# Patient Record
Sex: MALE | Race: WHITE | NOT HISPANIC OR LATINO | URBAN - METROPOLITAN AREA
[De-identification: names, ages, dates, MRNs, and addresses within clinical notes are randomized per-mention and may not be internally consistent; named-entity substitution may affect disease eponyms.]

---

## 2017-11-23 ENCOUNTER — EMERGENCY (EMERGENCY)
Facility: HOSPITAL | Age: 62
LOS: 1 days | Discharge: HOME | End: 2017-11-23
Admitting: EMERGENCY MEDICINE
Payer: MEDICARE

## 2017-11-23 DIAGNOSIS — E11.9 TYPE 2 DIABETES MELLITUS WITHOUT COMPLICATIONS: ICD-10-CM

## 2017-11-23 DIAGNOSIS — S79.911A UNSPECIFIED INJURY OF RIGHT HIP, INITIAL ENCOUNTER: ICD-10-CM
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
## 2017-11-23 DIAGNOSIS — S70.01XA CONTUSION OF RIGHT HIP, INITIAL ENCOUNTER: ICD-10-CM
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
## 2017-11-23 PROCEDURE — 99284 EMERGENCY DEPT VISIT MOD MDM: CPT

## 2017-11-23 PROCEDURE — 73502 X-RAY EXAM HIP UNI 2-3 VIEWS: CPT | Mod: 26,RT

## 2017-11-24 PROCEDURE — 99283 EMERGENCY DEPT VISIT LOW MDM: CPT | Mod: 25

## 2017-11-24 PROCEDURE — 73502 X-RAY EXAM HIP UNI 2-3 VIEWS: CPT

## 2017-11-24 PROCEDURE — 96372 THER/PROPH/DIAG INJ SC/IM: CPT

## 2017-12-02 ENCOUNTER — EMERGENCY (EMERGENCY)
Facility: HOSPITAL | Age: 62
LOS: 1 days | Discharge: HOME | End: 2017-12-02
Admitting: EMERGENCY MEDICINE
Payer: MEDICARE
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
## 2017-12-02 DIAGNOSIS — S89.91XA UNSPECIFIED INJURY OF RIGHT LOWER LEG, INITIAL ENCOUNTER: ICD-10-CM

## 2017-12-02 DIAGNOSIS — M25.551 PAIN IN RIGHT HIP: ICD-10-CM

## 2017-12-02 DIAGNOSIS — E11.9 TYPE 2 DIABETES MELLITUS WITHOUT COMPLICATIONS: ICD-10-CM
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
## 2017-12-02 DIAGNOSIS — S80.211A ABRASION, RIGHT KNEE, INITIAL ENCOUNTER: ICD-10-CM

## 2017-12-02 PROCEDURE — 99284 EMERGENCY DEPT VISIT MOD MDM: CPT

## 2017-12-02 PROCEDURE — 76376 3D RENDER W/INTRP POSTPROCES: CPT

## 2017-12-02 PROCEDURE — 76376 3D RENDER W/INTRP POSTPROCES: CPT | Mod: 26

## 2017-12-02 PROCEDURE — 72192 CT PELVIS W/O DYE: CPT

## 2017-12-02 PROCEDURE — 72192 CT PELVIS W/O DYE: CPT | Mod: 26

## 2017-12-02 PROCEDURE — 99284 EMERGENCY DEPT VISIT MOD MDM: CPT | Mod: 25

## 2018-06-21 ENCOUNTER — EMERGENCY (EMERGENCY)
Facility: HOSPITAL | Age: 63
LOS: 1 days | Discharge: HOME | End: 2018-06-21
Attending: EMERGENCY MEDICINE | Admitting: EMERGENCY MEDICINE
Payer: MEDICARE

## 2018-06-21 VITALS
RESPIRATION RATE: 20 BRPM | OXYGEN SATURATION: 100 % | HEART RATE: 87 BPM | DIASTOLIC BLOOD PRESSURE: 69 MMHG | SYSTOLIC BLOOD PRESSURE: 139 MMHG

## 2018-06-21 VITALS
HEART RATE: 79 BPM | SYSTOLIC BLOOD PRESSURE: 158 MMHG | DIASTOLIC BLOOD PRESSURE: 87 MMHG | TEMPERATURE: 97 F | RESPIRATION RATE: 20 BRPM | OXYGEN SATURATION: 95 % | WEIGHT: 259.93 LBS

## 2018-06-21 LAB
ALBUMIN SERPL ELPH-MCNC: 3.4 G/DL — SIGNIFICANT CHANGE UP (ref 3.3–5)
ALP SERPL-CCNC: 141 U/L — HIGH (ref 40–120)
ALT FLD-CCNC: 35 U/L DA — SIGNIFICANT CHANGE UP (ref 10–45)
ANION GAP SERPL CALC-SCNC: 10 MMOL/L — SIGNIFICANT CHANGE UP (ref 5–17)
APTT BLD: 28.5 SEC — SIGNIFICANT CHANGE UP (ref 27.5–37.4)
AST SERPL-CCNC: 22 U/L — SIGNIFICANT CHANGE UP (ref 10–40)
BASOPHILS # BLD AUTO: 0.1 K/UL — SIGNIFICANT CHANGE UP (ref 0–0.2)
BASOPHILS NFR BLD AUTO: 1 % — SIGNIFICANT CHANGE UP (ref 0–2)
BILIRUB SERPL-MCNC: 0.6 MG/DL — SIGNIFICANT CHANGE UP (ref 0.2–1.2)
BUN SERPL-MCNC: 30 MG/DL — HIGH (ref 7–23)
CALCIUM SERPL-MCNC: 9.1 MG/DL — SIGNIFICANT CHANGE UP (ref 8.4–10.5)
CHLORIDE SERPL-SCNC: 104 MMOL/L — SIGNIFICANT CHANGE UP (ref 96–108)
CK MB BLD-MCNC: 1.5 % — SIGNIFICANT CHANGE UP (ref 0–3.5)
CK MB CFR SERPL CALC: 2.8 NG/ML — SIGNIFICANT CHANGE UP (ref 0.5–10)
CK SERPL-CCNC: 187 U/L — SIGNIFICANT CHANGE UP (ref 30–200)
CO2 SERPL-SCNC: 22 MMOL/L — SIGNIFICANT CHANGE UP (ref 22–31)
CREAT SERPL-MCNC: 0.71 MG/DL — SIGNIFICANT CHANGE UP (ref 0.5–1.3)
EOSINOPHIL # BLD AUTO: 0.4 K/UL — SIGNIFICANT CHANGE UP (ref 0–0.5)
EOSINOPHIL NFR BLD AUTO: 4.4 % — SIGNIFICANT CHANGE UP (ref 0–6)
GLUCOSE SERPL-MCNC: 299 MG/DL — HIGH (ref 70–99)
HCT VFR BLD CALC: 44.5 % — SIGNIFICANT CHANGE UP (ref 39–50)
HGB BLD-MCNC: 16.1 G/DL — SIGNIFICANT CHANGE UP (ref 13–17)
INR BLD: 0.98 RATIO — SIGNIFICANT CHANGE UP (ref 0.88–1.16)
LYMPHOCYTES # BLD AUTO: 1.2 K/UL — SIGNIFICANT CHANGE UP (ref 1–3.3)
LYMPHOCYTES # BLD AUTO: 13.9 % — SIGNIFICANT CHANGE UP (ref 13–44)
MCHC RBC-ENTMCNC: 31.6 PG — SIGNIFICANT CHANGE UP (ref 27–34)
MCHC RBC-ENTMCNC: 36.3 GM/DL — HIGH (ref 32–36)
MCV RBC AUTO: 87 FL — SIGNIFICANT CHANGE UP (ref 80–100)
MONOCYTES # BLD AUTO: 0.6 K/UL — SIGNIFICANT CHANGE UP (ref 0–0.9)
MONOCYTES NFR BLD AUTO: 6.7 % — SIGNIFICANT CHANGE UP (ref 2–14)
NEUTROPHILS # BLD AUTO: 6.5 K/UL — SIGNIFICANT CHANGE UP (ref 1.8–7.4)
NEUTROPHILS NFR BLD AUTO: 74 % — SIGNIFICANT CHANGE UP (ref 43–77)
PLATELET # BLD AUTO: 184 K/UL — SIGNIFICANT CHANGE UP (ref 150–400)
POTASSIUM SERPL-MCNC: 4.4 MMOL/L — SIGNIFICANT CHANGE UP (ref 3.5–5.3)
POTASSIUM SERPL-SCNC: 4.4 MMOL/L — SIGNIFICANT CHANGE UP (ref 3.5–5.3)
PROT SERPL-MCNC: 7.2 G/DL — SIGNIFICANT CHANGE UP (ref 6–8.3)
PROTHROM AB SERPL-ACNC: 10.9 SEC — SIGNIFICANT CHANGE UP (ref 9.8–12.7)
RBC # BLD: 5.11 M/UL — SIGNIFICANT CHANGE UP (ref 4.2–5.8)
RBC # FLD: 11.2 % — SIGNIFICANT CHANGE UP (ref 10.3–14.5)
SODIUM SERPL-SCNC: 136 MMOL/L — SIGNIFICANT CHANGE UP (ref 135–145)
TROPONIN I SERPL-MCNC: <.017 NG/ML — LOW (ref 0.02–0.06)
WBC # BLD: 8.8 K/UL — SIGNIFICANT CHANGE UP (ref 3.8–10.5)
WBC # FLD AUTO: 8.8 K/UL — SIGNIFICANT CHANGE UP (ref 3.8–10.5)

## 2018-06-21 PROCEDURE — 70450 CT HEAD/BRAIN W/O DYE: CPT | Mod: 26

## 2018-06-21 PROCEDURE — 70486 CT MAXILLOFACIAL W/O DYE: CPT

## 2018-06-21 PROCEDURE — 85730 THROMBOPLASTIN TIME PARTIAL: CPT

## 2018-06-21 PROCEDURE — 71045 X-RAY EXAM CHEST 1 VIEW: CPT

## 2018-06-21 PROCEDURE — 82550 ASSAY OF CK (CPK): CPT

## 2018-06-21 PROCEDURE — 71045 X-RAY EXAM CHEST 1 VIEW: CPT | Mod: 26

## 2018-06-21 PROCEDURE — 70486 CT MAXILLOFACIAL W/O DYE: CPT | Mod: 26

## 2018-06-21 PROCEDURE — 80053 COMPREHEN METABOLIC PANEL: CPT

## 2018-06-21 PROCEDURE — 84484 ASSAY OF TROPONIN QUANT: CPT

## 2018-06-21 PROCEDURE — 93010 ELECTROCARDIOGRAM REPORT: CPT

## 2018-06-21 PROCEDURE — 99284 EMERGENCY DEPT VISIT MOD MDM: CPT | Mod: 25

## 2018-06-21 PROCEDURE — 93005 ELECTROCARDIOGRAM TRACING: CPT

## 2018-06-21 PROCEDURE — 82553 CREATINE MB FRACTION: CPT

## 2018-06-21 PROCEDURE — 82962 GLUCOSE BLOOD TEST: CPT

## 2018-06-21 PROCEDURE — 99285 EMERGENCY DEPT VISIT HI MDM: CPT

## 2018-06-21 PROCEDURE — 70450 CT HEAD/BRAIN W/O DYE: CPT

## 2018-06-21 PROCEDURE — 72125 CT NECK SPINE W/O DYE: CPT | Mod: 26

## 2018-06-21 PROCEDURE — G0426: CPT

## 2018-06-21 PROCEDURE — 85610 PROTHROMBIN TIME: CPT

## 2018-06-21 PROCEDURE — 85027 COMPLETE CBC AUTOMATED: CPT

## 2018-06-21 PROCEDURE — 72125 CT NECK SPINE W/O DYE: CPT

## 2018-06-21 NOTE — ED PROVIDER NOTE - PROGRESS NOTE DETAILS
Minor delay to CT due to initial history did not indicate last known normal, initially presented as unknown last seen normal. Upon further detailed questioning, pt was able to recall events that occurred at 5am. Pt currently A&Ox3, declining obs vs admit for tele. Pt has decisional capacity and understands the risk of signing out AMA, including death. Asked to call his sister Jimbo Damon [phone]. I spoke with his sister, states he follows up with Dr. Prince and she will ensure that he will. Dr. Prince paged. Spoke with Dr. Prince, will see pt in the office tomorrow.

## 2018-06-21 NOTE — ED PROVIDER NOTE - CARE PLAN
Principal Discharge DX:	Syncope, unspecified syncope type  Secondary Diagnosis:	Altered mental status, unspecified altered mental status type

## 2018-06-21 NOTE — STROKE CODE NOTE - SUBJECTIVE
62-year-old ambidextrous white gentleman first evaluated by telestroke at [facility] on 6/21/18 with change in mental status. On 6/21/18, he was last known normal at around 5:20 AM. At about 7:20 AM, he was found lying on his driveway by his neighbor, apparently "confused", and possibly with some abnormal movements of his extremities, possibly shaking (uncertain). Medications at home included aspirin. ROS otherwise negative. Exam. Right forehead hematoma. NIHSS = 0 versus 1 (depending on whether or not a slight facial asymmetry is physiological or pathological) Alert and attentive; oriented x3; follows commands; ? subtle left facial palsy (most likely constitutional); remainder of neurologic exam was nonfocal. CT head (6/21/18) to my eye was unremarkable. Fingerstick = 296.

## 2018-06-21 NOTE — ED PROVIDER NOTE - FAMILY HISTORY
Sibling  Still living? No  Family history of colon cancer, Age at diagnosis: Age Unknown     Mother  Still living? No  Family history of bone cancer, Age at diagnosis: Age Unknown

## 2018-06-21 NOTE — ED PROVIDER NOTE - OBJECTIVE STATEMENT
62M h/o DM, unemployed brought in by EMS for found on floor, initially altered, now at baseline. Per EMS pt was found on his driveway this AM by his neighbor, soaked due to rain, shaking his body, unknown urinary incontinence. Initially A&Ox0 and now A&Ox3. Unk sz or CVA d/o.     Per pt he was walking around collecting bottles at 5am and the next thing he remembers 62M h/o DM, unemployed brought in by EMS for found on floor, initially altered, now at baseline. Per EMS pt was found on his driveway this AM by his neighbor, soaked due to rain, shaking his body, unknown urinary incontinence. Initially A&Ox0 and now A&Ox3. Unk sz or CVA d/o.     Per pt he was walking around collecting bottles at 5am and the next thing he remembers was waking up on the floor with EMS. No weakness or numbness in arms or legs, no diff speaking. No chest pain or sob. No fevers or chills. No nausea or vomiting.

## 2018-06-21 NOTE — ED ADULT NURSE NOTE - OBJECTIVE STATEMENT
As per EMS, pt was found in driveway by neighbor. Pt reports taking daily walk around neighborhood and does not remember falling to ground. Presents with contusion to right side of forehead. As per EMS, pt was found in driveway by neighbor at 0730. Pt reports taking daily walk around neighborhood at 5am and does not remember falling to ground. Presents with contusion to right side of forehead.

## 2018-06-25 DIAGNOSIS — R56.9 UNSPECIFIED CONVULSIONS: ICD-10-CM

## 2019-06-28 ENCOUNTER — OUTPATIENT (OUTPATIENT)
Dept: OUTPATIENT SERVICES | Facility: HOSPITAL | Age: 64
LOS: 1 days | End: 2019-06-28
Payer: MEDICARE

## 2019-06-28 ENCOUNTER — APPOINTMENT (OUTPATIENT)
Dept: ULTRASOUND IMAGING | Facility: CLINIC | Age: 64
End: 2019-06-28
Payer: MEDICARE

## 2019-06-28 DIAGNOSIS — Z00.8 ENCOUNTER FOR OTHER GENERAL EXAMINATION: ICD-10-CM

## 2019-06-28 PROCEDURE — 76882 US LMTD JT/FCL EVL NVASC XTR: CPT | Mod: 26,RT

## 2019-06-28 PROCEDURE — 76882 US LMTD JT/FCL EVL NVASC XTR: CPT

## 2019-11-19 ENCOUNTER — EMERGENCY (EMERGENCY)
Facility: HOSPITAL | Age: 64
LOS: 1 days | Discharge: HOME | End: 2019-11-19
Attending: EMERGENCY MEDICINE | Admitting: EMERGENCY MEDICINE
Payer: MEDICARE

## 2019-11-19 VITALS
TEMPERATURE: 99 F | SYSTOLIC BLOOD PRESSURE: 183 MMHG | DIASTOLIC BLOOD PRESSURE: 107 MMHG | OXYGEN SATURATION: 96 % | HEIGHT: 76 IN | RESPIRATION RATE: 18 BRPM | HEART RATE: 99 BPM | WEIGHT: 250 LBS

## 2019-11-19 LAB
ALBUMIN SERPL ELPH-MCNC: 3.5 G/DL — SIGNIFICANT CHANGE UP (ref 3.3–5)
ALP SERPL-CCNC: 129 U/L — HIGH (ref 40–120)
ALT FLD-CCNC: 29 U/L — SIGNIFICANT CHANGE UP (ref 10–45)
AST SERPL-CCNC: 18 U/L — SIGNIFICANT CHANGE UP (ref 10–40)
BILIRUB DIRECT SERPL-MCNC: 0.1 MG/DL — SIGNIFICANT CHANGE UP (ref 0–0.2)
BILIRUB INDIRECT FLD-MCNC: 0.6 MG/DL — SIGNIFICANT CHANGE UP (ref 0.2–1)
BILIRUB SERPL-MCNC: 0.7 MG/DL — SIGNIFICANT CHANGE UP (ref 0.2–1.2)
PROT SERPL-MCNC: 7.5 G/DL — SIGNIFICANT CHANGE UP (ref 6–8.3)

## 2019-11-19 PROCEDURE — 71101 X-RAY EXAM UNILAT RIBS/CHEST: CPT

## 2019-11-19 PROCEDURE — 71101 X-RAY EXAM UNILAT RIBS/CHEST: CPT | Mod: 26

## 2019-11-19 PROCEDURE — 99284 EMERGENCY DEPT VISIT MOD MDM: CPT

## 2019-11-19 PROCEDURE — 99283 EMERGENCY DEPT VISIT LOW MDM: CPT

## 2019-11-19 PROCEDURE — 36415 COLL VENOUS BLD VENIPUNCTURE: CPT

## 2019-11-19 PROCEDURE — 80076 HEPATIC FUNCTION PANEL: CPT

## 2019-11-19 RX ORDER — ACETAMINOPHEN 500 MG
650 TABLET ORAL ONCE
Refills: 0 | Status: COMPLETED | OUTPATIENT
Start: 2019-11-19 | End: 2019-11-19

## 2019-11-19 RX ADMIN — Medication 650 MILLIGRAM(S): at 12:18

## 2019-11-19 NOTE — ED PROVIDER NOTE - OBJECTIVE STATEMENT
Patient notes that on Sunday, 3 days ago, he was reaching for something high up on a shelf and he slipped, striking the right side of his chest. He has been taking advil prn for pain but due to the fact that the pain persists, the patient came to the ED. Pain exac with movement, breathing and local palpation. No bruising noted to chest or back. No hematuria. No vomiting. No head injury or LOC.

## 2019-11-19 NOTE — ED ADULT NURSE NOTE - CHPI ED NUR SYMPTOMS NEG
no vomiting/no loss of consciousness/no numbness/no abrasion/no deformity/no weakness/no confusion/no tingling/no bleeding/no fever

## 2019-11-19 NOTE — ED PROVIDER NOTE - NSFOLLOWUPINSTRUCTIONS_ED_ALL_ED_FT
Rib Fracture(s)    A rib fracture is a break or crack in one of the bones of the ribs. The ribs are a group of long, curved bones that wrap around your chest and attach to your spine. A broken or cracked rib is often painful, but most do not cause other problems and heal on their own over time. Symptoms include pain when you breath or cough or pain when pressing over the area. Breathing exercises will help keep your lungs inflated and prevent lung collapse or infection.    Take Tylenol 650 mg every 6 hours as needed for pain or Ibuprofen 600mg every 6 hours as needed for pain.   If any new bruising occurs, return to the ED immediately. Also read below for other reasons to return to the ED. Follow up with your primary medical doctor. Thank you.     SEEK IMMEDIATE MEDICAL CARE IF YOU HAVE ANY OF THE FOLLOWING SYMPTOMS: fever, shortness of breath, coughing up blood, abdominal pain, nausea or vomiting, pain not controlled with medications.

## 2019-11-19 NOTE — ED PROVIDER NOTE - PATIENT PORTAL LINK FT
You can access the FollowMyHealth Patient Portal offered by [facility] by registering at the following website: [email or web address]. By joining [facility] [employer]’s FollowMyHealth portal, you will also be able to view your health information using other applications (apps) compatible with our system.

## 2019-11-19 NOTE — ED PROVIDER NOTE - CLINICAL SUMMARY MEDICAL DECISION MAKING FREE TEXT BOX
Patient notes that on Sunday, 3 days ago, he was reaching for something high up on a shelf and he slipped, striking the right side of his chest. He has been taking advil prn for pain but due to the fact that the pain persists, the patient came to the ED. Pain exac with movement, breathing and local palpation. No bruising noted to chest or back. No hematuria. No vomiting. No head injury or LOC.  Suspect right lower rib fracture. Since overlying the liver, LFT ordered. No bruising noted. LFTs normal. Xray report with 8th rib fracture. Pt made aware. F/U PCP. Advil or Tylenol prn pain.

## 2019-11-25 DIAGNOSIS — S22.31XA FRACTURE OF ONE RIB, RIGHT SIDE, INITIAL ENCOUNTER FOR CLOSED FRACTURE: ICD-10-CM

## 2020-03-08 NOTE — ED PROVIDER NOTE - CARDIAC, MLM
Normal rate, regular rhythm.  Heart sounds S1, S2.  No murmurs, rubs or gallops.
1) PCP Contacted on Admission: (Y/N) --> Name & Phone #:  2) Date of Contact with PCP:  3) PCP Contacted at Discharge: (Y/N, N/A)  4) Summary of Handoff Given to PCP:   5) Post-Discharge Appointment Date and Location:

## 2020-07-14 ENCOUNTER — INPATIENT (INPATIENT)
Facility: HOSPITAL | Age: 65
LOS: 3 days | Discharge: HOME | DRG: 308 | End: 2020-07-18
Attending: INTERNAL MEDICINE | Admitting: INTERNAL MEDICINE
Payer: MEDICARE

## 2020-07-14 VITALS
OXYGEN SATURATION: 92 % | HEIGHT: 76 IN | RESPIRATION RATE: 22 BRPM | TEMPERATURE: 98 F | WEIGHT: 250 LBS | DIASTOLIC BLOOD PRESSURE: 89 MMHG | HEART RATE: 134 BPM | SYSTOLIC BLOOD PRESSURE: 163 MMHG

## 2020-07-14 DIAGNOSIS — R73.9 HYPERGLYCEMIA, UNSPECIFIED: ICD-10-CM

## 2020-07-14 LAB
ACETONE SERPL-MCNC: ABNORMAL
ALBUMIN SERPL ELPH-MCNC: 3.7 G/DL — SIGNIFICANT CHANGE UP (ref 3.3–5)
ALP SERPL-CCNC: 274 U/L — HIGH (ref 40–120)
ALT FLD-CCNC: 113 U/L — HIGH (ref 10–45)
ANION GAP SERPL CALC-SCNC: 20 MMOL/L — HIGH (ref 5–17)
APPEARANCE UR: CLEAR — SIGNIFICANT CHANGE UP
AST SERPL-CCNC: 38 U/L — SIGNIFICANT CHANGE UP (ref 10–40)
BACTERIA # UR AUTO: ABNORMAL /HPF
BASOPHILS # BLD AUTO: 0.04 K/UL — SIGNIFICANT CHANGE UP (ref 0–0.2)
BASOPHILS NFR BLD AUTO: 0.4 % — SIGNIFICANT CHANGE UP (ref 0–2)
BILIRUB SERPL-MCNC: 0.5 MG/DL — SIGNIFICANT CHANGE UP (ref 0.2–1.2)
BILIRUB UR-MCNC: NEGATIVE — SIGNIFICANT CHANGE UP
BUN SERPL-MCNC: 41 MG/DL — HIGH (ref 7–23)
CALCIUM SERPL-MCNC: 9.9 MG/DL — SIGNIFICANT CHANGE UP (ref 8.4–10.5)
CHLORIDE SERPL-SCNC: 103 MMOL/L — SIGNIFICANT CHANGE UP (ref 96–108)
CO2 BLDV-SCNC: 20 MMOL/L — LOW (ref 21–29)
CO2 SERPL-SCNC: 18 MMOL/L — LOW (ref 22–31)
COLOR SPEC: YELLOW — SIGNIFICANT CHANGE UP
CREAT SERPL-MCNC: 1.46 MG/DL — HIGH (ref 0.5–1.3)
DIFF PNL FLD: ABNORMAL
EOSINOPHIL # BLD AUTO: 0.01 K/UL — SIGNIFICANT CHANGE UP (ref 0–0.5)
EOSINOPHIL NFR BLD AUTO: 0.1 % — SIGNIFICANT CHANGE UP (ref 0–6)
EPI CELLS # UR: SIGNIFICANT CHANGE UP
ETHANOL SERPL-MCNC: <3 MG/DL — SIGNIFICANT CHANGE UP (ref 0–3)
GLUCOSE SERPL-MCNC: 816 MG/DL — CRITICAL HIGH (ref 70–99)
GLUCOSE UR QL: 1000 MG/DL
HCT VFR BLD CALC: 49.5 % — SIGNIFICANT CHANGE UP (ref 39–50)
HGB BLD-MCNC: 17.4 G/DL — HIGH (ref 13–17)
IMM GRANULOCYTES NFR BLD AUTO: 0.7 % — SIGNIFICANT CHANGE UP (ref 0–1.5)
KETONES UR-MCNC: ABNORMAL
LACTATE SERPL-SCNC: 2.5 MMOL/L — HIGH (ref 0.7–2)
LEUKOCYTE ESTERASE UR-ACNC: NEGATIVE — SIGNIFICANT CHANGE UP
LYMPHOCYTES # BLD AUTO: 0.81 K/UL — LOW (ref 1–3.3)
LYMPHOCYTES # BLD AUTO: 8.2 % — LOW (ref 13–44)
MAGNESIUM SERPL-MCNC: 2.4 MG/DL — SIGNIFICANT CHANGE UP (ref 1.6–2.6)
MCHC RBC-ENTMCNC: 30.1 PG — SIGNIFICANT CHANGE UP (ref 27–34)
MCHC RBC-ENTMCNC: 35.2 GM/DL — SIGNIFICANT CHANGE UP (ref 32–36)
MCV RBC AUTO: 85.6 FL — SIGNIFICANT CHANGE UP (ref 80–100)
MONOCYTES # BLD AUTO: 0.47 K/UL — SIGNIFICANT CHANGE UP (ref 0–0.9)
MONOCYTES NFR BLD AUTO: 4.7 % — SIGNIFICANT CHANGE UP (ref 2–14)
NEUTROPHILS # BLD AUTO: 8.53 K/UL — HIGH (ref 1.8–7.4)
NEUTROPHILS NFR BLD AUTO: 85.9 % — HIGH (ref 43–77)
NITRITE UR-MCNC: NEGATIVE — SIGNIFICANT CHANGE UP
NRBC # BLD: 0 /100 WBCS — SIGNIFICANT CHANGE UP (ref 0–0)
PCO2 BLDV: 35 MMHG — SIGNIFICANT CHANGE UP (ref 35–50)
PCP SPEC-MCNC: SIGNIFICANT CHANGE UP
PH BLDV: 7.35 — SIGNIFICANT CHANGE UP (ref 7.35–7.45)
PH UR: 5 — SIGNIFICANT CHANGE UP (ref 5–8)
PHOSPHATE SERPL-MCNC: 5.2 MG/DL — HIGH (ref 2.5–4.5)
PLATELET # BLD AUTO: 244 K/UL — SIGNIFICANT CHANGE UP (ref 150–400)
PO2 BLDV: 49 MMHG — HIGH (ref 25–45)
POTASSIUM SERPL-MCNC: 4.2 MMOL/L — SIGNIFICANT CHANGE UP (ref 3.5–5.3)
POTASSIUM SERPL-SCNC: 4.2 MMOL/L — SIGNIFICANT CHANGE UP (ref 3.5–5.3)
PROT SERPL-MCNC: 7.5 G/DL — SIGNIFICANT CHANGE UP (ref 6–8.3)
PROT UR-MCNC: 30 MG/DL
RBC # BLD: 5.78 M/UL — SIGNIFICANT CHANGE UP (ref 4.2–5.8)
RBC # FLD: 11.9 % — SIGNIFICANT CHANGE UP (ref 10.3–14.5)
RBC CASTS # UR COMP ASSIST: SIGNIFICANT CHANGE UP /HPF (ref 0–4)
SAO2 % BLDV: 81 % — SIGNIFICANT CHANGE UP (ref 67–88)
SODIUM SERPL-SCNC: 141 MMOL/L — SIGNIFICANT CHANGE UP (ref 135–145)
SP GR SPEC: 1.01 — SIGNIFICANT CHANGE UP (ref 1.01–1.02)
TROPONIN I SERPL-MCNC: <.017 NG/ML — LOW (ref 0.02–0.06)
UROBILINOGEN FLD QL: NEGATIVE — SIGNIFICANT CHANGE UP
WBC # BLD: 9.93 K/UL — SIGNIFICANT CHANGE UP (ref 3.8–10.5)
WBC # FLD AUTO: 9.93 K/UL — SIGNIFICANT CHANGE UP (ref 3.8–10.5)
WBC UR QL: NEGATIVE /HPF — SIGNIFICANT CHANGE UP (ref 0–5)

## 2020-07-14 PROCEDURE — 93010 ELECTROCARDIOGRAM REPORT: CPT

## 2020-07-14 PROCEDURE — 99291 CRITICAL CARE FIRST HOUR: CPT | Mod: CS

## 2020-07-14 PROCEDURE — 70450 CT HEAD/BRAIN W/O DYE: CPT | Mod: 26

## 2020-07-14 PROCEDURE — 71045 X-RAY EXAM CHEST 1 VIEW: CPT | Mod: 26

## 2020-07-14 RX ORDER — CEFEPIME 1 G/1
2000 INJECTION, POWDER, FOR SOLUTION INTRAMUSCULAR; INTRAVENOUS ONCE
Refills: 0 | Status: COMPLETED | OUTPATIENT
Start: 2020-07-14 | End: 2020-07-14

## 2020-07-14 RX ORDER — DILTIAZEM HCL 120 MG
5 CAPSULE, EXT RELEASE 24 HR ORAL
Qty: 125 | Refills: 0 | Status: DISCONTINUED | OUTPATIENT
Start: 2020-07-14 | End: 2020-07-15

## 2020-07-14 RX ORDER — SODIUM CHLORIDE 9 MG/ML
2000 INJECTION INTRAMUSCULAR; INTRAVENOUS; SUBCUTANEOUS ONCE
Refills: 0 | Status: COMPLETED | OUTPATIENT
Start: 2020-07-14 | End: 2020-07-14

## 2020-07-14 RX ORDER — INSULIN HUMAN 100 [IU]/ML
10 INJECTION, SOLUTION SUBCUTANEOUS
Qty: 100 | Refills: 0 | Status: DISCONTINUED | OUTPATIENT
Start: 2020-07-14 | End: 2020-07-15

## 2020-07-14 RX ORDER — SODIUM CHLORIDE 9 MG/ML
1000 INJECTION, SOLUTION INTRAVENOUS
Refills: 0 | Status: DISCONTINUED | OUTPATIENT
Start: 2020-07-14 | End: 2020-07-15

## 2020-07-14 RX ORDER — DILTIAZEM HCL 120 MG
15 CAPSULE, EXT RELEASE 24 HR ORAL ONCE
Refills: 0 | Status: COMPLETED | OUTPATIENT
Start: 2020-07-14 | End: 2020-07-14

## 2020-07-14 RX ORDER — ASPIRIN/CALCIUM CARB/MAGNESIUM 324 MG
81 TABLET ORAL DAILY
Refills: 0 | Status: DISCONTINUED | OUTPATIENT
Start: 2020-07-14 | End: 2020-07-14

## 2020-07-14 RX ORDER — DILTIAZEM HCL 120 MG
10 CAPSULE, EXT RELEASE 24 HR ORAL ONCE
Refills: 0 | Status: COMPLETED | OUTPATIENT
Start: 2020-07-14 | End: 2020-07-14

## 2020-07-14 RX ORDER — TETANUS TOXOID, REDUCED DIPHTHERIA TOXOID AND ACELLULAR PERTUSSIS VACCINE, ADSORBED 5; 2.5; 8; 8; 2.5 [IU]/.5ML; [IU]/.5ML; UG/.5ML; UG/.5ML; UG/.5ML
0.5 SUSPENSION INTRAMUSCULAR ONCE
Refills: 0 | Status: COMPLETED | OUTPATIENT
Start: 2020-07-14 | End: 2020-07-14

## 2020-07-14 RX ORDER — ATORVASTATIN CALCIUM 80 MG/1
10 TABLET, FILM COATED ORAL AT BEDTIME
Refills: 0 | Status: DISCONTINUED | OUTPATIENT
Start: 2020-07-14 | End: 2020-07-18

## 2020-07-14 RX ORDER — SODIUM CHLORIDE 9 MG/ML
1000 INJECTION, SOLUTION INTRAVENOUS
Refills: 0 | Status: DISCONTINUED | OUTPATIENT
Start: 2020-07-14 | End: 2020-07-14

## 2020-07-14 RX ORDER — METOPROLOL TARTRATE 50 MG
25 TABLET ORAL
Refills: 0 | Status: DISCONTINUED | OUTPATIENT
Start: 2020-07-14 | End: 2020-07-18

## 2020-07-14 RX ORDER — INSULIN HUMAN 100 [IU]/ML
10 INJECTION, SOLUTION SUBCUTANEOUS ONCE
Refills: 0 | Status: COMPLETED | OUTPATIENT
Start: 2020-07-14 | End: 2020-07-14

## 2020-07-14 RX ORDER — CHLORHEXIDINE GLUCONATE 213 G/1000ML
1 SOLUTION TOPICAL
Refills: 0 | Status: DISCONTINUED | OUTPATIENT
Start: 2020-07-14 | End: 2020-07-18

## 2020-07-14 RX ORDER — SODIUM CHLORIDE 9 MG/ML
700 INJECTION INTRAMUSCULAR; INTRAVENOUS; SUBCUTANEOUS ONCE
Refills: 0 | Status: COMPLETED | OUTPATIENT
Start: 2020-07-14 | End: 2020-07-14

## 2020-07-14 RX ADMIN — Medication 10 MILLIGRAM(S): at 21:05

## 2020-07-14 RX ADMIN — INSULIN HUMAN 10 UNIT(S)/HR: 100 INJECTION, SOLUTION SUBCUTANEOUS at 23:15

## 2020-07-14 RX ADMIN — Medication 10 MILLIGRAM(S): at 21:14

## 2020-07-14 RX ADMIN — Medication 5 MG/HR: at 22:06

## 2020-07-14 RX ADMIN — SODIUM CHLORIDE 2000 MILLILITER(S): 9 INJECTION INTRAMUSCULAR; INTRAVENOUS; SUBCUTANEOUS at 20:42

## 2020-07-14 RX ADMIN — SODIUM CHLORIDE 2000 MILLILITER(S): 9 INJECTION INTRAMUSCULAR; INTRAVENOUS; SUBCUTANEOUS at 22:06

## 2020-07-14 RX ADMIN — Medication 15 MILLIGRAM(S): at 22:20

## 2020-07-14 RX ADMIN — Medication 10 MILLIGRAM(S): at 21:01

## 2020-07-14 RX ADMIN — SODIUM CHLORIDE 700 MILLILITER(S): 9 INJECTION INTRAMUSCULAR; INTRAVENOUS; SUBCUTANEOUS at 22:06

## 2020-07-14 RX ADMIN — Medication 15 MILLIGRAM(S): at 21:30

## 2020-07-14 RX ADMIN — CEFEPIME 100 MILLIGRAM(S): 1 INJECTION, POWDER, FOR SOLUTION INTRAMUSCULAR; INTRAVENOUS at 22:30

## 2020-07-14 RX ADMIN — SODIUM CHLORIDE 200 MILLILITER(S): 9 INJECTION, SOLUTION INTRAVENOUS at 23:19

## 2020-07-14 RX ADMIN — Medication 25 MILLIGRAM(S): at 23:13

## 2020-07-14 RX ADMIN — INSULIN HUMAN 10 UNIT(S): 100 INJECTION, SOLUTION SUBCUTANEOUS at 20:31

## 2020-07-14 RX ADMIN — TETANUS TOXOID, REDUCED DIPHTHERIA TOXOID AND ACELLULAR PERTUSSIS VACCINE, ADSORBED 0.5 MILLILITER(S): 5; 2.5; 8; 8; 2.5 SUSPENSION INTRAMUSCULAR at 20:46

## 2020-07-14 NOTE — ED ADULT NURSE NOTE - NSFALLRSKASSESSTYPE_ED_ALL_ED
Initial (On Arrival) Propranolol Counseling:  I discussed with the patient the risks of propranolol including but not limited to low heart rate, low blood pressure, low blood sugar, restlessness and increased cold sensitivity. They should call the office if they experience any of these side effects.

## 2020-07-14 NOTE — H&P ADULT - ASSESSMENT
63 y/o male with pmhx of HTN, DM II, HLD admitted s/p syncopal episode with DKA and afib with RVR along with ? very small SDH.    NEURO: q1 hour neurochecks with repeat CT head in 6 hours.     CVS: on cardizem drip at 15 mg/hr. also started PO metoprolol. wean off drip as tolerated.  goal HR < 100. check TTE. cardio consult in am.     PULM: weaned to nasal cannula, keep sats above 92%.  GI: npo except water and meds  RENAL: mild SOUMYA. hold home ramipril. q 4-6 hour bmp replete lytes as necessary. trend bun/cr and urine output.    ID: COVID-19 negative. afebrile. low suspicion for covid.    ENDO: received 10 units regular insulin IVP in ED. start infusion at 0.1 units/kg. accuchecks q 1 hours while on insulin drip. titrate per protocol. LR with KCL replacement at 200/hr and switch to dextrose containing fluids when BS < 250. endocrine consult in am.    HEME: hold anticoagulation until repeat CT head. if no SDH start a/c.   DISPO: full code    discussed with eICU attending dr milligan.

## 2020-07-14 NOTE — ED PROVIDER NOTE - CLINICAL SUMMARY MEDICAL DECISION MAKING FREE TEXT BOX
65 y/o M with PMH of DM on metformin and humalog, HTN, HLD, takes ASA was BIB EMS for fall with head injury x today and hyperglycemia. Patient was at shop and stop, states he felt lightheaded and fell hitting his head. Per EMS glucometer read as "high". Patient denies LOC.  He denies CP, SOB, f/c, cough, n/v/d, abd pain, urinary symptoms, extremity weakness or all other complaints. PE as noted above. GLU read as HIGH in the ED. labs/imaging pending. IVF bolus and insulin given. TDAP given 63 y/o M with PMH of DM on metformin and humalog, HTN, HLD, takes ASA was BIB EMS for fall with head injury x today and hyperglycemia. Patient was at shop and stop, states he felt lightheaded and fell hitting his head. Per EMS glucometer read as "high". Patient denies LOC.  He denies CP, SOB, f/c, cough, n/v/d, abd pain, urinary symptoms, extremity weakness or all other complaints. PE as noted above. GLU read as HIGH in the ED. labs/imaging pending. IVF bolus and insulin given. TDAP given    dr feldman [id number]: pt HR improving with cardizem boluses and gtt, -130s. bp stable. pt more alert, improved.  no complaints . no headache. no chest pain, no sob.  neuro intact. CTh noted, ?sliver of SDH vs artifact.  ICU aware M eJrry, they will repeat CT in AM.

## 2020-07-14 NOTE — H&P ADULT - NSHPPHYSICALEXAM_GEN_ALL_CORE
General: Normal appearing in no acute distress resting comfortably in bed. Well nourished and well groomed.  Head: Normocephalic, atraumatic.   EENT: Sclera white. PERRL, EOM intact. Secretions normal. no cervical lymphadenopathy  PULM: Breath sounds clear to auscultation symmetrically throughout all lung fields. No wheezing, rhonchi, or rales. No use of accessory muscles.  CVS: afib with RVR. No murmurs or rubs. Pulses 2+ on upper and lower extremities bilaterally.   GI: nondistended with bowel sounds normoactive in all four quadrants. No tenderness to light or deep palpation.   Neuro: A&O x 3. Strength 5/5 in upper and lower extremities. sensation equal bilaterally. accurate finger to nose. gait not assessed due to afib with RVR.   Skin: No lesions, rashes, ulcers. Extremities equally warm bilaterally.

## 2020-07-14 NOTE — ED ADULT TRIAGE NOTE - CHIEF COMPLAINT QUOTE
"high" fingerstick/ fall with head injury, no LOC   patient is awake and alert upon arrival to unit  patient unable to provide daily medications

## 2020-07-14 NOTE — ED PROVIDER NOTE - PROGRESS NOTE DETAILS
Dr Pérez: pt was in sinus tach hr 120's, suddenly became tachycardic, narrow complex on monitor, rate varying from 160s to 220s.  / 70.  mental status remains unchanged, awake, denies any complaints.  EKG done, showing afib with .  cardizem ordered

## 2020-07-14 NOTE — ED PROVIDER NOTE - CARE PLAN
Principal Discharge DX:	CHI (closed head injury), initial encounter Principal Discharge DX:	Hyperglycemia  Secondary Diagnosis:	Atrial fibrillation with RVR

## 2020-07-14 NOTE — ED PROVIDER NOTE - NEUROLOGICAL SENSATION
present and normal in 4 extremities/patient moving all extremity equally, sensation in tact in all extremity

## 2020-07-14 NOTE — H&P ADULT - HISTORY OF PRESENT ILLNESS
63 y/o male with pmhx of DM II (on insulin and metformin), HLD, HTN who presents to ED s/p syncopal event while grocery shopping. He hit his head on ground but did not lose consciousness. Upon arrival to ED patient is in afib with RVR to 180, hemodynamically stable and was also found to be in DKA with initial blood glucose of >800. He was given a total of 45 mg of cardizem IVP then started on cardizem gtt with limited improvement. At time of my arrival patients HR is 150 on 5 mg cardizem gtt. BP stable. increased to 15 mg per protocol.     Patient also received 10 units IVP insulin. Started on insulin gtt.    denies chest pain, shortness of breath, headache, fever, recent travel, cough, recent sick contact, palpitations, abdominal pain. States he has been compliant with meds except for tonight.    Also found to have tiny small SDH vs artifact on CT head. he is A&O x 4 and nonfocal.

## 2020-07-14 NOTE — ED ADULT NURSE NOTE - NSIMPLEMENTINTERV_GEN_ALL_ED
Implemented All Fall Risk Interventions:  [place] to call system. Call bell, personal items and telephone within reach. Instruct patient to call for assistance. Room bathroom lighting operational. Non-slip footwear when patient is off stretcher. Physically safe environment: no spills, clutter or unnecessary equipment. Stretcher in lowest position, wheels locked, appropriate side rails in place. Provide visual cue, wrist band, yellow gown, etc. Monitor gait and stability. Monitor for mental status changes and reorient to person, place, and time. Review medications for side effects contributing to fall risk. Reinforce activity limits and safety measures with patient and family.

## 2020-07-14 NOTE — H&P ADULT - NSICDXFAMILYHX_GEN_ALL_CORE_FT
FAMILY HISTORY:  Family history of bone cancer    Sibling  Still living? No  Family history of colon cancer, Age at diagnosis: Age Unknown

## 2020-07-14 NOTE — H&P ADULT - NSICDXPASTMEDICALHX_GEN_ALL_CORE_FT
PAST MEDICAL HISTORY:  Dyslipidemia     Hypertension     Shoulder pain, left     Type 2 diabetes mellitus

## 2020-07-14 NOTE — ED PROVIDER NOTE - ATTENDING CONTRIBUTION TO CARE
pt bibems c/o dizziness today at stop and shop and fell. no reported LOC. FSG high per EMS.  pt denies any other complaints. no headache, neck/back pain, chest/abd pain. no fever/chills, cough, sob.    exam:   General: NAD, abrasive, cantankerous, reluctant to give HPI, cursing at MD, threatening to kick me if I kept bothering him.   HEENT: eyes perrl, nose normal, OP no erythema/exudate/swelling.   head: mild 3cm swelling R posterior scalp. no bleeding  cor: tachycardic, regular rhythm, s1s2, 2+rad pulses.   lungs: ctabl, no resp distress.   abd: soft, ntnd.   neuro: a&ox 2 person/place, refused to answer date, cn2-12 intact, DRAPER, 5/5 strength c nl sensation all extremities, nl coordination.   MSK: no extremity swelling.  Skin: normal, no rash    AP: pt bibems c/o dizziness today at stop and shop and fell. no reported LOC. FSG high per EMS.  pt denies any other complaints. no headache, neck/back pain, chest/abd pain. no fever/chills, cough, sob.    exam:   General: NAD, abrasive, cantankerous, reluctant to give HPI, cursing at MD, threatening to kick me if I kept bothering him.   HEENT: eyes perrl, nose normal, OP no erythema/exudate/swelling.   head: mild 3cm swelling R posterior scalp. no bleeding  cor: tachycardic, regular rhythm, s1s2, 2+rad pulses.   lungs: ctabl, no resp distress.   abd: soft, ntnd.   neuro: a&ox 2 person/place, refused to answer date, cn2-12 intact, DRAPER, 5/5 strength c nl sensation all extremities, nl coordination.   MSK: no extremity swelling.  Skin: normal, no rash    AP: nonspecific dizziness and fall with head injury. neuro intact. pt appears dry, tachycardic, mild hypoxia.  FSG "high".  r/o DKA, sepsis, head injury/ICH, covid. check labs, ct head,ekg, give iv fluids, insulin. reassess pt bibems c/o dizziness today at stop and shop and fell. no reported LOC. FSG high per EMS.  pt denies any other complaints. no headache, neck/back pain, chest/abd pain. no fever/chills, cough, sob.    exam:   General: NAD, awake but mildly drowsy appearing , abrasive, cantankerous, reluctant to give HPI, cursing at MD, threatening to kick me if I kept bothering him.   HEENT: eyes perrl, nose normal, OP no erythema/exudate/swelling.   head: mild 3cm swelling R posterior scalp. no bleeding  cor: tachycardic, regular rhythm, s1s2, 2+rad pulses.   lungs: ctabl, no resp distress.   abd: soft, ntnd.   neuro: a&ox 2 person/place, refused to answer date, cn2-12 intact, DRAPER, 5/5 strength c nl sensation all extremities, nl coordination.   MSK: no extremity swelling.  Skin: normal, no rash    AP: nonspecific dizziness and fall with head injury. neuro intact. pt appears dry, tachycardic, mild hypoxia.  FSG "high".  r/o DKA, sepsis, head injury/ICH, covid. check labs, ct head,ekg, give iv fluids, insulin. reassess

## 2020-07-14 NOTE — ED PROVIDER NOTE - OBJECTIVE STATEMENT
65 y/o M with PMH of DM on metformin and humalog, HTN, HLD, takes ASA was BIB EMS for fall with head injury x today and hyperglycemia. Patient was at shop and stop, states he felt lightheaded and fell hitting his head. Per EMS glucometer read as "high". Patient denies LOC.  He denies CP, SOB, f/c, cough, n/v/d, abd pain, urinary symptoms, extremity weakness or all other complaints.

## 2020-07-15 LAB
-  COAGULASE NEGATIVE STAPHYLOCOCCUS: SIGNIFICANT CHANGE UP
A1C WITH ESTIMATED AVERAGE GLUCOSE RESULT: 13.7 % — HIGH (ref 4–5.6)
ALBUMIN SERPL ELPH-MCNC: 3 G/DL — LOW (ref 3.3–5)
ALP SERPL-CCNC: 166 U/L — HIGH (ref 40–120)
ALT FLD-CCNC: 89 U/L — HIGH (ref 10–45)
ANION GAP SERPL CALC-SCNC: 5 MMOL/L — SIGNIFICANT CHANGE UP (ref 5–17)
ANION GAP SERPL CALC-SCNC: 6 MMOL/L — SIGNIFICANT CHANGE UP (ref 5–17)
ANION GAP SERPL CALC-SCNC: 9 MMOL/L — SIGNIFICANT CHANGE UP (ref 5–17)
APTT BLD: 29.6 SEC — SIGNIFICANT CHANGE UP (ref 27.5–35.5)
AST SERPL-CCNC: 25 U/L — SIGNIFICANT CHANGE UP (ref 10–40)
BASOPHILS # BLD AUTO: 0.03 K/UL — SIGNIFICANT CHANGE UP (ref 0–0.2)
BASOPHILS NFR BLD AUTO: 0.2 % — SIGNIFICANT CHANGE UP (ref 0–2)
BILIRUB SERPL-MCNC: 0.4 MG/DL — SIGNIFICANT CHANGE UP (ref 0.2–1.2)
BUN SERPL-MCNC: 25 MG/DL — HIGH (ref 7–23)
BUN SERPL-MCNC: 29 MG/DL — HIGH (ref 7–23)
BUN SERPL-MCNC: 29 MG/DL — HIGH (ref 7–23)
CALCIUM SERPL-MCNC: 8.7 MG/DL — SIGNIFICANT CHANGE UP (ref 8.4–10.5)
CALCIUM SERPL-MCNC: 9 MG/DL — SIGNIFICANT CHANGE UP (ref 8.4–10.5)
CALCIUM SERPL-MCNC: 9.1 MG/DL — SIGNIFICANT CHANGE UP (ref 8.4–10.5)
CHLORIDE SERPL-SCNC: 115 MMOL/L — HIGH (ref 96–108)
CHLORIDE SERPL-SCNC: 116 MMOL/L — HIGH (ref 96–108)
CHLORIDE SERPL-SCNC: 116 MMOL/L — HIGH (ref 96–108)
CHOLEST SERPL-MCNC: 195 MG/DL — SIGNIFICANT CHANGE UP (ref 10–199)
CO2 SERPL-SCNC: 25 MMOL/L — SIGNIFICANT CHANGE UP (ref 22–31)
CO2 SERPL-SCNC: 28 MMOL/L — SIGNIFICANT CHANGE UP (ref 22–31)
CO2 SERPL-SCNC: 29 MMOL/L — SIGNIFICANT CHANGE UP (ref 22–31)
CREAT SERPL-MCNC: 0.78 MG/DL — SIGNIFICANT CHANGE UP (ref 0.5–1.3)
CREAT SERPL-MCNC: 0.96 MG/DL — SIGNIFICANT CHANGE UP (ref 0.5–1.3)
CREAT SERPL-MCNC: 1.04 MG/DL — SIGNIFICANT CHANGE UP (ref 0.5–1.3)
EOSINOPHIL # BLD AUTO: 0.03 K/UL — SIGNIFICANT CHANGE UP (ref 0–0.5)
EOSINOPHIL NFR BLD AUTO: 0.2 % — SIGNIFICANT CHANGE UP (ref 0–6)
ESTIMATED AVERAGE GLUCOSE: 346 MG/DL — HIGH (ref 68–114)
GLUCOSE BLDC GLUCOMTR-MCNC: 151 MG/DL — HIGH (ref 70–99)
GLUCOSE BLDC GLUCOMTR-MCNC: 161 MG/DL — HIGH (ref 70–99)
GLUCOSE BLDC GLUCOMTR-MCNC: 161 MG/DL — HIGH (ref 70–99)
GLUCOSE BLDC GLUCOMTR-MCNC: 166 MG/DL — HIGH (ref 70–99)
GLUCOSE BLDC GLUCOMTR-MCNC: 171 MG/DL — HIGH (ref 70–99)
GLUCOSE BLDC GLUCOMTR-MCNC: 191 MG/DL — HIGH (ref 70–99)
GLUCOSE BLDC GLUCOMTR-MCNC: 193 MG/DL — HIGH (ref 70–99)
GLUCOSE BLDC GLUCOMTR-MCNC: 196 MG/DL — HIGH (ref 70–99)
GLUCOSE BLDC GLUCOMTR-MCNC: 202 MG/DL — HIGH (ref 70–99)
GLUCOSE BLDC GLUCOMTR-MCNC: 221 MG/DL — HIGH (ref 70–99)
GLUCOSE BLDC GLUCOMTR-MCNC: 286 MG/DL — HIGH (ref 70–99)
GLUCOSE BLDC GLUCOMTR-MCNC: 305 MG/DL — HIGH (ref 70–99)
GLUCOSE BLDC GLUCOMTR-MCNC: 349 MG/DL — HIGH (ref 70–99)
GLUCOSE BLDC GLUCOMTR-MCNC: 352 MG/DL — HIGH (ref 70–99)
GLUCOSE BLDC GLUCOMTR-MCNC: 408 MG/DL — HIGH (ref 70–99)
GLUCOSE BLDC GLUCOMTR-MCNC: 448 MG/DL — HIGH (ref 70–99)
GLUCOSE BLDC GLUCOMTR-MCNC: 466 MG/DL — CRITICAL HIGH (ref 70–99)
GLUCOSE BLDC GLUCOMTR-MCNC: 500 MG/DL — CRITICAL HIGH (ref 70–99)
GLUCOSE SERPL-MCNC: 193 MG/DL — HIGH (ref 70–99)
GLUCOSE SERPL-MCNC: 278 MG/DL — HIGH (ref 70–99)
GLUCOSE SERPL-MCNC: 283 MG/DL — HIGH (ref 70–99)
GRAM STN FLD: SIGNIFICANT CHANGE UP
HCT VFR BLD CALC: 44.9 % — SIGNIFICANT CHANGE UP (ref 39–50)
HCV AB S/CO SERPL IA: 0.12 S/CO — SIGNIFICANT CHANGE UP (ref 0–0.99)
HCV AB SERPL-IMP: SIGNIFICANT CHANGE UP
HDLC SERPL-MCNC: 50 MG/DL — SIGNIFICANT CHANGE UP
HGB BLD-MCNC: 15.2 G/DL — SIGNIFICANT CHANGE UP (ref 13–17)
IMM GRANULOCYTES NFR BLD AUTO: 0.8 % — SIGNIFICANT CHANGE UP (ref 0–1.5)
INR BLD: 0.97 RATIO — SIGNIFICANT CHANGE UP (ref 0.88–1.16)
LACTATE SERPL-SCNC: 1.5 MMOL/L — SIGNIFICANT CHANGE UP (ref 0.7–2)
LACTATE SERPL-SCNC: 1.8 MMOL/L — SIGNIFICANT CHANGE UP (ref 0.7–2)
LIPID PNL WITH DIRECT LDL SERPL: 111 MG/DL — HIGH
LYMPHOCYTES # BLD AUTO: 1.53 K/UL — SIGNIFICANT CHANGE UP (ref 1–3.3)
LYMPHOCYTES # BLD AUTO: 11.1 % — LOW (ref 13–44)
MAGNESIUM SERPL-MCNC: 2.2 MG/DL — SIGNIFICANT CHANGE UP (ref 1.6–2.6)
MAGNESIUM SERPL-MCNC: 2.3 MG/DL — SIGNIFICANT CHANGE UP (ref 1.6–2.6)
MAGNESIUM SERPL-MCNC: 2.3 MG/DL — SIGNIFICANT CHANGE UP (ref 1.6–2.6)
MCHC RBC-ENTMCNC: 29.9 PG — SIGNIFICANT CHANGE UP (ref 27–34)
MCHC RBC-ENTMCNC: 33.9 GM/DL — SIGNIFICANT CHANGE UP (ref 32–36)
MCV RBC AUTO: 88.4 FL — SIGNIFICANT CHANGE UP (ref 80–100)
METHOD TYPE: SIGNIFICANT CHANGE UP
MONOCYTES # BLD AUTO: 1.11 K/UL — HIGH (ref 0–0.9)
MONOCYTES NFR BLD AUTO: 8 % — SIGNIFICANT CHANGE UP (ref 2–14)
NEUTROPHILS # BLD AUTO: 10.99 K/UL — HIGH (ref 1.8–7.4)
NEUTROPHILS NFR BLD AUTO: 79.7 % — HIGH (ref 43–77)
NRBC # BLD: 0 /100 WBCS — SIGNIFICANT CHANGE UP (ref 0–0)
PHOSPHATE SERPL-MCNC: 2.4 MG/DL — LOW (ref 2.5–4.5)
PHOSPHATE SERPL-MCNC: 2.6 MG/DL — SIGNIFICANT CHANGE UP (ref 2.5–4.5)
PHOSPHATE SERPL-MCNC: 3.1 MG/DL — SIGNIFICANT CHANGE UP (ref 2.5–4.5)
PLATELET # BLD AUTO: 214 K/UL — SIGNIFICANT CHANGE UP (ref 150–400)
POTASSIUM SERPL-MCNC: 4 MMOL/L — SIGNIFICANT CHANGE UP (ref 3.5–5.3)
POTASSIUM SERPL-MCNC: 4.4 MMOL/L — SIGNIFICANT CHANGE UP (ref 3.5–5.3)
POTASSIUM SERPL-MCNC: 4.8 MMOL/L — SIGNIFICANT CHANGE UP (ref 3.5–5.3)
POTASSIUM SERPL-SCNC: 4 MMOL/L — SIGNIFICANT CHANGE UP (ref 3.5–5.3)
POTASSIUM SERPL-SCNC: 4.4 MMOL/L — SIGNIFICANT CHANGE UP (ref 3.5–5.3)
POTASSIUM SERPL-SCNC: 4.8 MMOL/L — SIGNIFICANT CHANGE UP (ref 3.5–5.3)
PROT SERPL-MCNC: 6.8 G/DL — SIGNIFICANT CHANGE UP (ref 6–8.3)
PROTHROM AB SERPL-ACNC: 11.7 SEC — SIGNIFICANT CHANGE UP (ref 10.6–13.6)
RBC # BLD: 5.08 M/UL — SIGNIFICANT CHANGE UP (ref 4.2–5.8)
RBC # FLD: 12 % — SIGNIFICANT CHANGE UP (ref 10.3–14.5)
SARS-COV-2 IGG SERPL QL IA: NEGATIVE — SIGNIFICANT CHANGE UP
SARS-COV-2 IGM SERPL IA-ACNC: 14.6 AU/ML — SIGNIFICANT CHANGE UP
SARS-COV-2 RNA SPEC QL NAA+PROBE: SIGNIFICANT CHANGE UP
SODIUM SERPL-SCNC: 149 MMOL/L — HIGH (ref 135–145)
SODIUM SERPL-SCNC: 150 MMOL/L — HIGH (ref 135–145)
SODIUM SERPL-SCNC: 150 MMOL/L — HIGH (ref 135–145)
SPECIMEN SOURCE: SIGNIFICANT CHANGE UP
SPECIMEN SOURCE: SIGNIFICANT CHANGE UP
TOTAL CHOLESTEROL/HDL RATIO MEASUREMENT: 3.9 RATIO — SIGNIFICANT CHANGE UP (ref 3.4–9.6)
TRIGL SERPL-MCNC: 170 MG/DL — HIGH (ref 10–149)
TROPONIN I SERPL-MCNC: <.017 NG/ML — LOW (ref 0.02–0.06)
TSH SERPL-MCNC: 2.85 UIU/ML — SIGNIFICANT CHANGE UP (ref 0.27–4.2)
WBC # BLD: 13.8 K/UL — HIGH (ref 3.8–10.5)
WBC # FLD AUTO: 13.8 K/UL — HIGH (ref 3.8–10.5)

## 2020-07-15 PROCEDURE — 70450 CT HEAD/BRAIN W/O DYE: CPT | Mod: 26

## 2020-07-15 PROCEDURE — 93306 TTE W/DOPPLER COMPLETE: CPT | Mod: 26

## 2020-07-15 PROCEDURE — 99223 1ST HOSP IP/OBS HIGH 75: CPT

## 2020-07-15 RX ORDER — INSULIN GLARGINE 100 [IU]/ML
28 INJECTION, SOLUTION SUBCUTANEOUS AT BEDTIME
Refills: 0 | Status: DISCONTINUED | OUTPATIENT
Start: 2020-07-15 | End: 2020-07-16

## 2020-07-15 RX ORDER — SODIUM CHLORIDE 9 MG/ML
1000 INJECTION, SOLUTION INTRAVENOUS
Refills: 0 | Status: DISCONTINUED | OUTPATIENT
Start: 2020-07-15 | End: 2020-07-16

## 2020-07-15 RX ORDER — INSULIN LISPRO 100/ML
6 VIAL (ML) SUBCUTANEOUS
Refills: 0 | Status: DISCONTINUED | OUTPATIENT
Start: 2020-07-15 | End: 2020-07-18

## 2020-07-15 RX ORDER — DEXTROSE 50 % IN WATER 50 %
25 SYRINGE (ML) INTRAVENOUS ONCE
Refills: 0 | Status: DISCONTINUED | OUTPATIENT
Start: 2020-07-15 | End: 2020-07-18

## 2020-07-15 RX ORDER — GLUCAGON INJECTION, SOLUTION 0.5 MG/.1ML
1 INJECTION, SOLUTION SUBCUTANEOUS ONCE
Refills: 0 | Status: DISCONTINUED | OUTPATIENT
Start: 2020-07-15 | End: 2020-07-18

## 2020-07-15 RX ORDER — INSULIN LISPRO 100/ML
VIAL (ML) SUBCUTANEOUS
Refills: 0 | Status: DISCONTINUED | OUTPATIENT
Start: 2020-07-15 | End: 2020-07-18

## 2020-07-15 RX ORDER — DEXTROSE 50 % IN WATER 50 %
15 SYRINGE (ML) INTRAVENOUS ONCE
Refills: 0 | Status: DISCONTINUED | OUTPATIENT
Start: 2020-07-15 | End: 2020-07-18

## 2020-07-15 RX ORDER — SODIUM CHLORIDE 9 MG/ML
1000 INJECTION, SOLUTION INTRAVENOUS
Refills: 0 | Status: DISCONTINUED | OUTPATIENT
Start: 2020-07-15 | End: 2020-07-18

## 2020-07-15 RX ORDER — VANCOMYCIN HCL 1 G
1000 VIAL (EA) INTRAVENOUS EVERY 12 HOURS
Refills: 0 | Status: DISCONTINUED | OUTPATIENT
Start: 2020-07-15 | End: 2020-07-16

## 2020-07-15 RX ORDER — POTASSIUM PHOSPHATE, MONOBASIC POTASSIUM PHOSPHATE, DIBASIC 236; 224 MG/ML; MG/ML
15 INJECTION, SOLUTION INTRAVENOUS ONCE
Refills: 0 | Status: COMPLETED | OUTPATIENT
Start: 2020-07-15 | End: 2020-07-15

## 2020-07-15 RX ORDER — INSULIN GLARGINE 100 [IU]/ML
28 INJECTION, SOLUTION SUBCUTANEOUS ONCE
Refills: 0 | Status: COMPLETED | OUTPATIENT
Start: 2020-07-15 | End: 2020-07-15

## 2020-07-15 RX ORDER — INSULIN HUMAN 100 [IU]/ML
7 INJECTION, SOLUTION SUBCUTANEOUS
Qty: 100 | Refills: 0 | Status: DISCONTINUED | OUTPATIENT
Start: 2020-07-15 | End: 2020-07-15

## 2020-07-15 RX ORDER — DEXTROSE 50 % IN WATER 50 %
12.5 SYRINGE (ML) INTRAVENOUS ONCE
Refills: 0 | Status: DISCONTINUED | OUTPATIENT
Start: 2020-07-15 | End: 2020-07-18

## 2020-07-15 RX ADMIN — Medication 25 MILLIGRAM(S): at 17:55

## 2020-07-15 RX ADMIN — INSULIN GLARGINE 28 UNIT(S): 100 INJECTION, SOLUTION SUBCUTANEOUS at 16:28

## 2020-07-15 RX ADMIN — POTASSIUM PHOSPHATE, MONOBASIC POTASSIUM PHOSPHATE, DIBASIC 62.5 MILLIMOLE(S): 236; 224 INJECTION, SOLUTION INTRAVENOUS at 07:56

## 2020-07-15 RX ADMIN — INSULIN GLARGINE 28 UNIT(S): 100 INJECTION, SOLUTION SUBCUTANEOUS at 21:57

## 2020-07-15 RX ADMIN — Medication 250 MILLIGRAM(S): at 18:32

## 2020-07-15 RX ADMIN — SODIUM CHLORIDE 50 MILLILITER(S): 9 INJECTION, SOLUTION INTRAVENOUS at 21:57

## 2020-07-15 RX ADMIN — SODIUM CHLORIDE 200 MILLILITER(S): 9 INJECTION, SOLUTION INTRAVENOUS at 05:02

## 2020-07-15 RX ADMIN — ATORVASTATIN CALCIUM 10 MILLIGRAM(S): 80 TABLET, FILM COATED ORAL at 21:57

## 2020-07-15 RX ADMIN — Medication 25 MILLIGRAM(S): at 05:05

## 2020-07-15 NOTE — ED ADULT NURSE REASSESSMENT NOTE - NS ED NURSE REASSESS COMMENT FT1
ptcontinues to improve at this time he is on cardizem drip at 5 mg per hour  insulin drip at 10 units per hour and ivf  at 200 ml per hour  last blood glucose level is 466 pt awake alert

## 2020-07-15 NOTE — PROVIDER CONTACT NOTE (CRITICAL VALUE NOTIFICATION) - TEST AND RESULT REPORTED:
blood culture results 7/14/2020;  preliminary results show growth in aerobi bottle; gram positive cocci in clusters

## 2020-07-15 NOTE — PROGRESS NOTE ADULT - SUBJECTIVE AND OBJECTIVE BOX
Patient is a 64y old  Male who presents with a chief complaint of DKA and afib with RVR (15 Jul 2020 13:59)    HPI:  65 y/o male with pmhx of DM, HLD, HTN presented to ED s/p syncopal episode. In ED patient noted to be in Afib w/ RVR and started on Cardizem gtt. Patient in DKA as well requiring Insulin gtt and transfer to ICU.    Today DKA resolved and transitioned off Insulin gtt. Currently in sinus rythm off Cardizem gtt. At bedside denies any shortness of breath, chest pain, headache, dizziness or syncope    Allergies: No Known Allergies    PAST MEDICAL & SURGICAL HISTORY:  Shoulder pain, left  Dyslipidemia  Hypertension  Type 2 diabetes mellitus  S/P knee surgery: left, 30 years ago-left    FAMILY HISTORY:  Family history of bone cancer  Family history of colon cancer (Sibling)    SOCIAL HISTORY:    Home Medications:    Review of Systems:  Constitutional: no fever, chills, fatigue  Neuro: no headache, numbness, weakness  Resp: no cough, wheezing, shortness of breath  CVS: no chest pain, palpitations, leg swelling  GI: no abdominal pain, nausea, vomiting, diarrhea   : no dysuria, frequency, incontinence    T(F): 98.1 (07-15-20 @ 16:00), Max: 98.1 (07-15-20 @ 16:00)  HR: 79 (07-15-20 @ 18:00) (77 - 167)  BP: 177/91 (07-15-20 @ 17:00) (105/71 - 177/91)  RR: 27 (07-15-20 @ 18:00) (12 - 27)  SpO2: 98% (07-15-20 @ 18:00)  Wt(kg): --    CAPILLARY BLOOD GLUCOSE      POCT Blood Glucose.: 221 mg/dL (15 Jul 2020 17:47)    I&O's Summary    2020 07:01  -  15 Jul 2020 07:00  --------------------------------------------------------  IN: 1352.3 mL / OUT: 601 mL / NET: 751.3 mL    15 Jul 2020 07:01  -  15 Jul 2020 19:47  --------------------------------------------------------  IN: 2114.6 mL / OUT: 601 mL / NET: 1513.6 mL        Physical Exam:     Gen: NAD  Neuro: awake alert and oriented, DRAPER  CVS: Regular rate and rythm  Resp: CTA  Abd: soft, NT, ND  Ext: warm, dry, no edema    Meds:  vancomycin  IVPB 1000 milliGRAM(s) IV Intermittent every 12 hours    metoprolol tartrate 25 milliGRAM(s) Oral two times a day     atorvastatin 10 milliGRAM(s) Oral at bedtime  dextrose 40% Gel 15 Gram(s) Oral once PRN  dextrose 50% Injectable 12.5 Gram(s) IV Push once  dextrose 50% Injectable 25 Gram(s) IV Push once  dextrose 50% Injectable 25 Gram(s) IV Push once  glucagon  Injectable 1 milliGRAM(s) IntraMuscular once PRN  insulin glargine Injectable (LANTUS) 28 Unit(s) SubCutaneous at bedtime  insulin lispro (HumaLOG) corrective regimen sliding scale   SubCutaneous three times a day before meals  insulin lispro Injectable (HumaLOG) 6 Unit(s) SubCutaneous before breakfast  insulin lispro Injectable (HumaLOG) 6 Unit(s) SubCutaneous before lunch  insulin lispro Injectable (HumaLOG) 6 Unit(s) SubCutaneous before dinner                       dextrose 5%. 1000 milliLiter(s) IV Continuous <Continuous>        chlorhexidine 2% Cloths 1 Application(s) Topical <User Schedule>                              15.2   13.80 )-----------( 214      ( 15 Jul 2020 05:30 )             44.9       07-15    149<H>  |  115<H>  |  25<H>  ----------------------------<  193<H>  4.8   |  29  |  0.78    Ca    8.7      15 Jul 2020 11:25  Phos  3.1     07-15  Mg     2.2     07-15    TPro  6.8  /  Alb  3.0<L>  /  TBili  0.4  /  DBili  x   /  AST  25  /  ALT  89<H>  /  AlkPhos  166<H>  07-15    Lactate 1.8           07-15 @ 05:30    Lactate 1.5           07-14 @ 23:00    Lactate 2.5           07- @ 20:20      CARDIAC MARKERS ( 15 Jul 2020 05:30 )  <.017 ng/mL / x     / x     / x     / x      CARDIAC MARKERS ( 2020 20:20 )  <.017 ng/mL / x     / x     / x     / x          PT/INR - ( 2020 23:00 )   PT: 11.7 sec;   INR: 0.97 ratio         PTT - ( 2020 23:00 )  PTT:29.6 sec  Urinalysis Basic - ( 2020 20:20 )    Color: Yellow / Appearance: Clear / S.010 / pH: x  Gluc: x / Ketone: Trace  / Bili: Negative / Urobili: Negative   Blood: x / Protein: 30 mg/dL / Nitrite: Negative   Leuk Esterase: Negative / RBC: 0-4 /HPF / WBC Negative /HPF   Sq Epi: x / Non Sq Epi: Neg.-Few / Bacteria: Few /HPF      .Blood Blood-Peripheral   Growth in anaerobic bottle: Gram Positive Cocci in Clusters  Growth in aerobic bottle: Gram Positive Cocci in Clusters  "Due to technical problems, Proteus sp. will Not be reported as part of  the BCID panel until further notice"  ***Blood Panel PCR results on this specimen are available  approximately 3 hours after the Gram stain result.***  Gram stain, PCR, and/or culture results may not always  correspond due to difference in methodologies.  ************************************************************  This PCR assay was performed using [employer].  The following targets are tested for: Enterococcus,  vancomycin resistant enterococci, Listeria monocytogenes,  coagulase negative staphylococci, S. aureus,  methicillin resistant S. aureus, Streptococcus agalactiae  (Group B), S. pneumoniae, S. pyogenes (Group A),  Acinetobacter baumannii, Enterobacter cloacae, E. coli,  Klebsiella oxytoca, K. pneumoniae, Proteus sp.,  Serratia marcescens, Haemophilus influenzae,  Neisseria meningitidis, Pseudomonas aeruginosa, Candida  albicans, C. glabrata, C krusei, C parapsilosis,  C. tropicalis and the KPC resistance gene.   Growth in anaerobic bottle: Gram Positive Cocci in Clusters  Growth in aerobic bottle: Gram Positive Cocci in Clusters - @ 20:20          Radiology:   EXAM:  CT BRAIN      PROCEDURE DATE:  07/15/2020        INTERPRETATION:  CLINICAL INFORMATION: f/u ? SDH. . ADMDIAG1: R73.9 HYPERGLYCEMIA, UNSPECIFIED/.    TECHNIQUE: Sequential axial images were obtained from the vertex to the skull base without intravenous contrast. Coronal and sagittal reformations were obtained.     COMPARISON: Prior CT dated 2020.  MRI brain 10/1/2009.    FINDINGS:    A left frontal extra-axial density is unchanged. This has a linear appearance, suggesting a vessel (2:21, 602:36). No evidence of subdural hematoma.    There is no acute intracranial hemorrhage or mass effect. There are areas of hypodensity in the bilateral hemispheric white matter suggesting chronic white matter microvascular ischemic change. The ventricles and sulci are normal in size for patient's age.     There is no displaced calvarial fracture. Right parietal scalp soft tissue swelling. The visualized orbits are within normal limits. The visualized portions of the paranasal sinuses are wellaerated. The mastoid air cells are well aerated.      IMPRESSION:   1.  A left frontal extra-axial density is unchanged. This has a linear appearance, suggesting a vessel. No evidence of subdural hematoma.  2.  Right parietal scalp soft tissue swelling.                  EMELY BARRON M.D., ATTENDING RADIOLOGIST  This document has been electronically signed. Jul 15 2020 11:28AM        DKA/Hyperglycemia  Afib w/ RVR  Hypernatremia  Gram + Bacteremia    Assessment/Plan:  63 yo M pmhx HTN, HLD, DM presented s/p syncopal episode found to be in Afib w/ RVR and DKA requiring Insulin gtt.    -Insulin gtt transitioned off. Started on Lantus and Lispro SS. Diabetic NP following  -Diabetic diet  -Discontinue LR infusion w/ KCl. Hypernatremic, start 1/2 NS  -Afib now rate controlled, currently in sinus rythm. Rate control w/ Lopressor 25mg BID. Cardiology following. AC on hold due to CTH findings, repeat stable. Defer AC initiation to Cardiology  -Blood cx growing Gram + cocci in clusters. Empiric Vanco 1g BID started. Follow cultures and sensitivity  -DVT PPX: SCD    Discussed w/ Intensivist Dr. Summers, patient to be transferred to Telemetry

## 2020-07-15 NOTE — DIETITIAN INITIAL EVALUATION ADULT. - OTHER INFO
65 y/o male with pmhx of HTN, DM II, HLD admitted s/p syncopal episode with DKA and afib with RVR along with ? very small SDH.. Insulin infusion noted, NPO at present . Unable to obtain diet history however A1c 13.7% , will need diet instruction/review. 63 y/o male with pmhx of HTN, DM II, HLD admitted s/p syncopal episode with DKA and afib with RVR along with ? very small SDH.. Insulin infusion noted, NPO at present . Unable to obtain diet history however A1c 13.7% , will need diet instruction/review. No edema , BM noted PTA.

## 2020-07-16 LAB
A1C WITH ESTIMATED AVERAGE GLUCOSE RESULT: 13.6 % — HIGH (ref 4–5.6)
ANION GAP SERPL CALC-SCNC: 8 MMOL/L — SIGNIFICANT CHANGE UP (ref 5–17)
BUN SERPL-MCNC: 16 MG/DL — SIGNIFICANT CHANGE UP (ref 7–23)
CALCIUM SERPL-MCNC: 8.1 MG/DL — LOW (ref 8.4–10.5)
CHLORIDE SERPL-SCNC: 109 MMOL/L — HIGH (ref 96–108)
CO2 SERPL-SCNC: 25 MMOL/L — SIGNIFICANT CHANGE UP (ref 22–31)
CREAT SERPL-MCNC: 0.72 MG/DL — SIGNIFICANT CHANGE UP (ref 0.5–1.3)
CULTURE RESULTS: SIGNIFICANT CHANGE UP
CULTURE RESULTS: SIGNIFICANT CHANGE UP
ESTIMATED AVERAGE GLUCOSE: 344 MG/DL — HIGH (ref 68–114)
GLUCOSE BLDC GLUCOMTR-MCNC: 247 MG/DL — HIGH (ref 70–99)
GLUCOSE BLDC GLUCOMTR-MCNC: 263 MG/DL — HIGH (ref 70–99)
GLUCOSE BLDC GLUCOMTR-MCNC: 284 MG/DL — HIGH (ref 70–99)
GLUCOSE BLDC GLUCOMTR-MCNC: 372 MG/DL — HIGH (ref 70–99)
GLUCOSE SERPL-MCNC: 276 MG/DL — HIGH (ref 70–99)
HCT VFR BLD CALC: 40.8 % — SIGNIFICANT CHANGE UP (ref 39–50)
HGB BLD-MCNC: 13.8 G/DL — SIGNIFICANT CHANGE UP (ref 13–17)
MCHC RBC-ENTMCNC: 29.9 PG — SIGNIFICANT CHANGE UP (ref 27–34)
MCHC RBC-ENTMCNC: 33.8 GM/DL — SIGNIFICANT CHANGE UP (ref 32–36)
MCV RBC AUTO: 88.3 FL — SIGNIFICANT CHANGE UP (ref 80–100)
NRBC # BLD: 0 /100 WBCS — SIGNIFICANT CHANGE UP (ref 0–0)
ORGANISM # SPEC MICROSCOPIC CNT: SIGNIFICANT CHANGE UP
ORGANISM # SPEC MICROSCOPIC CNT: SIGNIFICANT CHANGE UP
PLATELET # BLD AUTO: 174 K/UL — SIGNIFICANT CHANGE UP (ref 150–400)
POTASSIUM SERPL-MCNC: 4 MMOL/L — SIGNIFICANT CHANGE UP (ref 3.5–5.3)
POTASSIUM SERPL-SCNC: 4 MMOL/L — SIGNIFICANT CHANGE UP (ref 3.5–5.3)
RBC # BLD: 4.62 M/UL — SIGNIFICANT CHANGE UP (ref 4.2–5.8)
RBC # FLD: 12.2 % — SIGNIFICANT CHANGE UP (ref 10.3–14.5)
SODIUM SERPL-SCNC: 142 MMOL/L — SIGNIFICANT CHANGE UP (ref 135–145)
SPECIMEN SOURCE: SIGNIFICANT CHANGE UP
SPECIMEN SOURCE: SIGNIFICANT CHANGE UP
WBC # BLD: 10.99 K/UL — HIGH (ref 3.8–10.5)
WBC # FLD AUTO: 10.99 K/UL — HIGH (ref 3.8–10.5)

## 2020-07-16 PROCEDURE — 99233 SBSQ HOSP IP/OBS HIGH 50: CPT

## 2020-07-16 PROCEDURE — 99232 SBSQ HOSP IP/OBS MODERATE 35: CPT

## 2020-07-16 RX ORDER — INSULIN GLARGINE 100 [IU]/ML
30 INJECTION, SOLUTION SUBCUTANEOUS AT BEDTIME
Refills: 0 | Status: DISCONTINUED | OUTPATIENT
Start: 2020-07-16 | End: 2020-07-17

## 2020-07-16 RX ADMIN — ATORVASTATIN CALCIUM 10 MILLIGRAM(S): 80 TABLET, FILM COATED ORAL at 21:11

## 2020-07-16 RX ADMIN — Medication 4: at 17:27

## 2020-07-16 RX ADMIN — Medication 6 UNIT(S): at 17:28

## 2020-07-16 RX ADMIN — Medication 250 MILLIGRAM(S): at 05:54

## 2020-07-16 RX ADMIN — Medication 6: at 08:28

## 2020-07-16 RX ADMIN — Medication 25 MILLIGRAM(S): at 17:29

## 2020-07-16 RX ADMIN — Medication 6: at 12:43

## 2020-07-16 RX ADMIN — INSULIN GLARGINE 30 UNIT(S): 100 INJECTION, SOLUTION SUBCUTANEOUS at 21:10

## 2020-07-16 RX ADMIN — Medication 6 UNIT(S): at 08:29

## 2020-07-16 RX ADMIN — Medication 25 MILLIGRAM(S): at 05:55

## 2020-07-16 RX ADMIN — Medication 6 UNIT(S): at 12:43

## 2020-07-16 NOTE — CONSULT NOTE ADULT - ASSESSMENT
64 year old man presents post syncope.  He was noted to have rapid atrial fibrillation (new diagnosis) and was in DKA.  Head CT with possible small subdural hematoma.  Echo consistent with normal LV function and no significant valvular or pericardial disease   He has since converted to sinus rhythm post IV diltiazem    Plan  - agree with metoprolol... increase dose as tolerable by BP and HR  - hold off on anticoagulation for now .. await repeat head CT  - insulin as per ICU   - check TFTs if not recently done  - follow labs   - further plans pending hospital course    discussed with patient and with ICU team
63 yo male with DM, HTN, and HLD admitted 7/14 with a syncopal event.  As per the patient he was well prior to the syncopal event.He was treated for DKA and rapid A Fib.  Coag negative staph may be growing in 3/4 bottles although he has no foreign material and I am concerned that it may reflect procurement contaminants from ER blood cultures.  He has no localizing signs of infection, history of fever or chills, and while Coag  negative staph can be a pathogen it is also a common contaminant.  Suggest:  1.hold additional antibiotics  2.repeat blood cultures  3.low threshold for a RENATA if repeat bottles turn positive  4.follow fever curve and labs  5.Additional w/u pending clinical course
Uncontrolled T2 DM, AF, Gram+ bactremia now.  Suggest: continue Basal/ Bolus insulin now.   Discharge on Nph insulin 30 units sc bid with SMBG.  will see him in my office after discharge.

## 2020-07-16 NOTE — PROGRESS NOTE ADULT - ASSESSMENT
Assessment  Syncope   Afib with RVR - new onset  -Possible SDH on CT - no anticoagulation - sequential stockings   Rate control with Lopressor off Cardizem drip  hold A/c due to possible SDH  repeat CT head  neuro consult  Cardio consult     uncontrolled DM2   history odf DM2   with hyperglycemia / HHS  hgba1c 13.6  new accu check given to pt   diabetic education with follow up with Lana young out pt   Lantus 28 q pm   accucheck and ISS    Hypernatremia/Dehydration  IV hydration     Bacteremia- coag neg staph  continue IV Vanco  ID consult     HTN  Continue metoprolol, cardizam     PMD:	Dr Prince			                   Notified(Date):  Family Updated: Jimbo	[phone]	                                 Goals of Care: Full code Assessment  Syncope   Afib with RVR - new onset  -Possible SDH on CT - no anticoagulation - sequential stockings   Rate control with Lopressor off Cardizem drip  hold A/c due to possible SDH  repeat CT head  neuro consult  Cardio consult     uncontrolled DM2   history odf DM2   with hyperglycemia / HHS  hgba1c 13.6  new accu check given to pt   diabetic education with follow up with Lana young out pt   Lantus 28 q pm   accucheck and ISS    Hypernatremia/Dehydration- resolved   IV hydration     Bacteremia- coag neg staph  continue IV Vanco  ID consult   repeat blood cx 7/16 ordered     HTN  currently stable   Continue metoprolol, cardizam     PMD:	Dr Prince			                   Notified(Date):  Family Updated: Jmibo	[phone]	                                 Goals of Care: Full code Assessment  Syncope   Afib with RVR - new onset  -Possible SDH on CT - no anticoagulation - sequential stockings   Rate control with Lopressor off Cardizem drip  hold A/c due to possible SDH  repeat CT head  neuro consult  Cardio consult     uncontrolled DM2   history odf DM2   with hyperglycemia / HHS  hgba1c 13.6  new accu check given to pt   diabetic education with follow up with Lana young out pt   Lantus 28 q pm   accucheck and ISS    Hypernatremia/Dehydration- resolved   IV hydration     Bacteremia- coag neg staph  continue IV Vanco  ID consult   repeat blood cx 7/16 ordered     HTN  currently stable   Continue metoprolol, cardizam     PMD:	Dr Prince			                   Notified(Date):  Family Updated: Jimbo	[phone] spoke with her 7/16 to update her on nancy progress	                                 Goals of Care: Full code Assessment  Syncope   Afib with RVR - new onset  -Possible SDH on CT - no anticoagulation - sequential stockings   Rate control with Lopressor off Cardizem drip  hold A/c due to possible SDH  repeat CT head  neuro consult  Cardio consult     DKA - now resolved   Uncontrolled DM2 - A1C 13.6   - s/p ICU   new accucheck given to pt   diabetic education with follow up with Lana JAMES as out pt   Lantus 28 q pm   accucheck and ISS    Hypernatremia/Dehydration- resolved   IV hydration     Bacteremia- coag neg staph  - follow up repeat cultures   - follow up ID   continue IV Vanco    HTN  currently stable   Continue metoprolol, cardizam     PMD:	Dr Prince			                   Notified(Date):  Family Updated: Jimbo	[phone] spoke with her 7/16 to update her on nancy progress	                                 Goals of Care: Full code

## 2020-07-16 NOTE — PROGRESS NOTE ADULT - SUBJECTIVE AND OBJECTIVE BOX
Follow up for afib  SUBJ:    no complaints offered    PMH  Shoulder pain, left  Obesity  Dyslipidemia  Hypertension  Type 2 diabetes mellitus      MEDICATIONS  (STANDING):  atorvastatin 10 milliGRAM(s) Oral at bedtime  chlorhexidine 2% Cloths 1 Application(s) Topical <User Schedule>  dextrose 5%. 1000 milliLiter(s) (50 mL/Hr) IV Continuous <Continuous>  dextrose 50% Injectable 12.5 Gram(s) IV Push once  dextrose 50% Injectable 25 Gram(s) IV Push once  dextrose 50% Injectable 25 Gram(s) IV Push once  insulin glargine Injectable (LANTUS) 30 Unit(s) SubCutaneous at bedtime  insulin lispro (HumaLOG) corrective regimen sliding scale   SubCutaneous three times a day before meals  insulin lispro Injectable (HumaLOG) 6 Unit(s) SubCutaneous before breakfast  insulin lispro Injectable (HumaLOG) 6 Unit(s) SubCutaneous before lunch  insulin lispro Injectable (HumaLOG) 6 Unit(s) SubCutaneous before dinner  metoprolol tartrate 25 milliGRAM(s) Oral two times a day  sodium chloride 0.45%. 1000 milliLiter(s) (50 mL/Hr) IV Continuous <Continuous>    MEDICATIONS  (PRN):  dextrose 40% Gel 15 Gram(s) Oral once PRN Blood Glucose LESS THAN 70 milliGRAM(s)/deciliter  glucagon  Injectable 1 milliGRAM(s) IntraMuscular once PRN Glucose LESS THAN 70 milligrams/deciliter        PHYSICAL EXAM:  Vital Signs Last 24 Hrs  T(C): 36.5 (16 Jul 2020 08:49), Max: 36.9 (15 Jul 2020 20:00)  T(F): 97.7 (16 Jul 2020 08:49), Max: 98.4 (15 Jul 2020 20:00)  HR: 70 (16 Jul 2020 08:49) (70 - 80)  BP: 168/90 (16 Jul 2020 08:49) (145/67 - 177/91)  BP(mean): 103 (15 Jul 2020 20:00) (100 - 113)  RR: 16 (16 Jul 2020 08:49) (16 - 28)  SpO2: 94% (16 Jul 2020 08:49) (94% - 100%)    GENERAL: NAD, well-groomed, well-developed  HEAD:  Atraumatic, Normocephalic  EYES: EOMI, PERRLA, conjunctiva and sclera clear  ENT: Moist mucous membranes,  NECK: Supple, No JVD, no bruits  CHEST/LUNG: Clear to percussion bilaterally; No rales, rhonchi, wheezing, or rubs  HEART: Regular rate and rhythm; No murmurs, rubs, or gallops PMI non displaced.  ABDOMEN: Soft, Nontender, Nondistended; Bowel sounds present  EXTREMITIES:  2+ Peripheral Pulses, No clubbing, cyanosis, or edema  SKIN: No rashes or lesions  NERVOUS SYSTEM:  Cranial Nerves II-XII intact      TELEMETRY:    rsr    ECG:  < from: 12 Lead ECG (07.14.20 @ 20:55) >  Diagnosis Line Atrial fibrillation with rapid ventricular response  Minimal voltage criteria for LVH, may be normal variant  Marked ST abnormality, possible lateral subendocardial injury  Abnormal ECG  When compared with ECG of 14-JUL-2020 20:15,  Atrial fibrillation has replaced Sinus rhythm  ST now depressed in Anterior leads  Confirmed by GILDA QURESHI, CARLOZ TONG ([id number]) on 7/15/2020 8:43:18 AM    < end of copied text >      ECHO:    < from: TTE Echo Complete w/o Contrast w/ Doppler (07.15.20 @ 08:05) >  Summary:   1. Left ventricular ejection fraction, by visual estimation, is 50 to 55%.   2. Normal global left ventricular systolic function.   3. Normal left ventricular internal cavity size.   4. Spectral Doppler shows impaired relaxation pattern of left ventricular myocardial filling (Grade I diastolic dysfunction).   5. There is no evidence of pericardial effusion.   6. Mild mitral valve regurgitation.   7. Thickening of the anterior and posterior mitral valve leaflets.   8. Sclerotic aortic valve with normal opening.   9. LA volume Index is 19.9 ml/m² ml/m2.    [id number] Carloz Whaely MD, [facility] , Electronically signed on 7/15/2020 at 9:13:11 AM       *** Final ***      CARLOZ WHALEY M.D., ATTENDING CARDIOLOGIST  This document has been electronically signed. Jul 15 2020  8:05AM    < end of copied text >      LABS:                        13.8   10.99 )-----------( 174      ( 16 Jul 2020 08:10 )             40.8     07-16    142  |  109<H>  |  16  ----------------------------<  276<H>  4.0   |  25  |  0.72    Ca    8.1<L>      16 Jul 2020 08:10  Phos  3.1     07-15  Mg     2.2     07-15    TPro  6.8  /  Alb  3.0<L>  /  TBili  0.4  /  DBili  x   /  AST  25  /  ALT  89<H>  /  AlkPhos  166<H>  07-15    CARDIAC MARKERS ( 15 Jul 2020 05:30 )  <.017 ng/mL / x     / x     / x     / x      CARDIAC MARKERS ( 14 Jul 2020 20:20 )  <.017 ng/mL / x     / x     / x     / x          PT/INR - ( 14 Jul 2020 23:00 )   PT: 11.7 sec;   INR: 0.97 ratio         PTT - ( 14 Jul 2020 23:00 )  PTT:29.6 sec    I&O's Summary    15 Jul 2020 07:01  -  16 Jul 2020 07:00  --------------------------------------------------------  IN: 2114.6 mL / OUT: 1301 mL / NET: 813.6 mL    16 Jul 2020 07:01  -  16 Jul 2020 16:38  --------------------------------------------------------  IN: 750 mL / OUT: 1200 mL / NET: -450 mL      BNP    RADIOLOGY & ADDITIONAL STUDIES:  < from: CT Head No Cont (07.15.20 @ 10:35) >  IMPRESSION:   1.  A left frontal extra-axial density is unchanged. This has a linear appearance, suggesting a vessel. No evidence of subdural hematoma.  2.  Right parietal scalp soft tissue swelling.    < end of copied text >        ECHO:

## 2020-07-16 NOTE — PROGRESS NOTE ADULT - ATTENDING COMMENTS
I have personally seen and examined patient on the above date.  I discussed the case with MITCHELL Ventura and I agree with findings and plan as detailed per note above, which I have amended where appropriate.      Bacteremia secondary to Staph Coag negative  awaiting repeat blood cultures  continue vanco IV

## 2020-07-16 NOTE — PROGRESS NOTE ADULT - SUBJECTIVE AND OBJECTIVE BOX
Patient is a 64y old  Male who presents with a chief complaint of DKA and afib with RVR (15 Jul 2020 19:46)      Patient seen and examined at bedside.    ALLERGIES:  No Known Allergies    MEDICATIONS  (STANDING):  atorvastatin 10 milliGRAM(s) Oral at bedtime  chlorhexidine 2% Cloths 1 Application(s) Topical <User Schedule>  dextrose 5%. 1000 milliLiter(s) (50 mL/Hr) IV Continuous <Continuous>  dextrose 50% Injectable 12.5 Gram(s) IV Push once  dextrose 50% Injectable 25 Gram(s) IV Push once  dextrose 50% Injectable 25 Gram(s) IV Push once  insulin glargine Injectable (LANTUS) 28 Unit(s) SubCutaneous at bedtime  insulin lispro (HumaLOG) corrective regimen sliding scale   SubCutaneous three times a day before meals  insulin lispro Injectable (HumaLOG) 6 Unit(s) SubCutaneous before breakfast  insulin lispro Injectable (HumaLOG) 6 Unit(s) SubCutaneous before lunch  insulin lispro Injectable (HumaLOG) 6 Unit(s) SubCutaneous before dinner  metoprolol tartrate 25 milliGRAM(s) Oral two times a day  sodium chloride 0.45%. 1000 milliLiter(s) (50 mL/Hr) IV Continuous <Continuous>  vancomycin  IVPB 1000 milliGRAM(s) IV Intermittent every 12 hours    MEDICATIONS  (PRN):  dextrose 40% Gel 15 Gram(s) Oral once PRN Blood Glucose LESS THAN 70 milliGRAM(s)/deciliter  glucagon  Injectable 1 milliGRAM(s) IntraMuscular once PRN Glucose LESS THAN 70 milligrams/deciliter    Vital Signs Last 24 Hrs  T(F): 98.4 (2020 05:00), Max: 98.4 (15 Jul 2020 20:00)  HR: 76 (2020 05:00) (72 - 84)  BP: 145/67 (2020 05:00) (132/61 - 177/91)  RR: 22 (2020 05:00) (12 - 28)  SpO2: 100% (2020 05:00) (91% - 100%)  I&O's Summary    15 Jul 2020 07:01  -  2020 07:00  --------------------------------------------------------  IN: 2114.6 mL / OUT: 1301 mL / NET: 813.6 mL      PHYSICAL EXAM:  General: NAD, A/O x 3  ENT: MMM  Neck: Supple, No JVD  Lungs: Clear to auscultation bilaterally, Non labored breathing   Cardio: RRR, S1/S2, No murmurs  Abdomen: Soft, Nontender, Nondistended; Bowel sounds present  Extremities: No calf tenderness, No pitting edema    LABS:                        15.2   13.80 )-----------( 214      ( 15 Jul 2020 05:30 )             44.9     07-15    149  |  115  |  25  ----------------------------<  193  4.8   |  29  |  0.78    Ca    8.7      15 Jul 2020 11:25  Phos  3.1     -15  Mg     2.2     07-15    TPro  6.8  /  Alb  3.0  /  TBili  0.4  /  DBili  x   /  AST  25  /  ALT  89  /  AlkPhos  166  -15    eGFR if Non African American: 96 mL/min/1.73M2 (07-15-20 @ 11:25)  eGFR if African American: 111 mL/min/1.73M2 (07-15-20 @ 11:25)    PT/INR - ( 2020 23:00 )   PT: 11.7 sec;   INR: 0.97 ratio         PTT - ( 2020 23:00 )  PTT:29.6 sec  Lactate, Blood: 1.8 mmol/L (07-15 @ 05:30)  Lactate, Blood: 1.5 mmol/L ( @ 23:00)  Lactate, Blood: 2.5 mmol/L ( @ 20:20)    CARDIAC MARKERS ( 15 Jul 2020 05:30 )  <.017 ng/mL / x     / x     / x     / x      CARDIAC MARKERS ( 2020 20:20 )  <.017 ng/mL / x     / x     / x     / x          07-15 Chol 195 mg/dL  mg/dL HDL 50 mg/dL Trig 170 mg/dL  TSH 2.85   TSH with FT4 reflex --  Total T3 --    20:20 - VBG - pH: 7.35  | pCO2: 35    | pO2: 49    | Lactate:                  POCT Blood Glucose.: 286 mg/dL (15 Jul 2020 21:50)  POCT Blood Glucose.: 221 mg/dL (15 Jul 2020 17:47)  POCT Blood Glucose.: 171 mg/dL (15 Jul 2020 16:47)  POCT Blood Glucose.: 193 mg/dL (15 Jul 2020 15:47)  POCT Blood Glucose.: 151 mg/dL (15 Jul 2020 14:14)  POCT Blood Glucose.: 161 mg/dL (15 Jul 2020 13:07)  POCT Blood Glucose.: 166 mg/dL (15 Jul 2020 12:15)  POCT Blood Glucose.: 191 mg/dL (15 Jul 2020 11:14)  POCT Blood Glucose.: 161 mg/dL (15 Jul 2020 09:54)  POCT Blood Glucose.: 202 mg/dL (15 Jul 2020 08:56)  POCT Blood Glucose.: 196 mg/dL (15 Jul 2020 07:56)      Urinalysis Basic - ( 2020 20:20 )    Color: Yellow / Appearance: Clear / S.010 / pH: x  Gluc: x / Ketone: Trace  / Bili: Negative / Urobili: Negative   Blood: x / Protein: 30 mg/dL / Nitrite: Negative   Leuk Esterase: Negative / RBC: 0-4 /HPF / WBC Negative /HPF   Sq Epi: x / Non Sq Epi: Neg.-Few / Bacteria: Few /HPF        Culture - Urine (collected 2020 20:20)  Source: .Urine Clean Catch (Midstream)  Final Report (2020 02:52):    <10,000 CFU/mL Normal Urogenital Dolores    Culture - Blood (collected 2020 20:20)  Source: .Blood Blood-Peripheral  Gram Stain (15 Jul 2020 23:07):    Growth in aerobic bottle: Gram Positive Cocci in Clusters  Preliminary Report (15 Jul 2020 23:08):    Growth in aerobic bottle: Gram Positive Cocci in Clusters    Culture - Blood (collected 2020 20:20)  Source: .Blood Blood-Peripheral  Gram Stain (15 Jul 2020 18:41):    Growth in anaerobic bottle: Gram Positive Cocci in Clusters    Growth in aerobic bottle: Gram Positive Cocci in Clusters  Preliminary Report (15 Jul 2020 18:41):    Growth in anaerobic bottle: Gram Positive Cocci in Clusters    Growth in aerobic bottle: Gram Positive Cocci in Clusters    "Due to technical problems, Proteus sp. will Not be reported as part of    the BCID panel until further notice"    ***Blood Panel PCR results on this specimen are available    approximately 3 hours after the Gram stain result.***    Gram stain, PCR, and/or culture results may not always    correspond due to difference in methodologies.    ************************************************************    This PCR assay was performed using [employer]    The following targets are tested for: Enterococcus,    vancomycin resistant enterococci, Listeria monocytogenes,    coagulase negative staphylococci, S. aureus,    methicillin resistant S. aureus, Streptococcus agalactiae    (Group B), S. pneumoniae, S. pyogenes (Group A),    Acinetobacter baumannii, Enterobacter cloacae, E. coli,    Klebsiella oxytoca, K. pneumoniae, Proteus sp.,    Serratia marcescens, Haemophilus influenzae,    Neisseria meningitidis, Pseudomonas aeruginosa, Candida    albicans, C. glabrata, C krusei, C parapsilosis,    C. tropicalis and the KPC resistance gene.  Organism: Blood Culture PCR (15 Jul 2020 18:13)  Organism: Blood Culture PCR (15 Jul 2020 18:13)      -  Coagulase negative Staphylococcus: Detec      Method Type: PCR      RADIOLOGY & ADDITIONAL TESTS:    Care Discussed with Consultants/Other Providers: Patient is a 64y old  Male who presents with a chief complaint of DKA and afib with RVR.      Patient seen and examined at bedside.  Pt states did not sleep well last pm-- too much noise.  Pt doing well today without complaints.  Pt states would like to eat today-- says he hasent eaten since he got here.      ALLERGIES:  No Known Allergies    MEDICATIONS  (STANDING):  atorvastatin 10 milliGRAM(s) Oral at bedtime  chlorhexidine 2% Cloths 1 Application(s) Topical <User Schedule>  dextrose 5%. 1000 milliLiter(s) (50 mL/Hr) IV Continuous <Continuous>  dextrose 50% Injectable 12.5 Gram(s) IV Push once  dextrose 50% Injectable 25 Gram(s) IV Push once  dextrose 50% Injectable 25 Gram(s) IV Push once  insulin glargine Injectable (LANTUS) 28 Unit(s) SubCutaneous at bedtime  insulin lispro (HumaLOG) corrective regimen sliding scale   SubCutaneous three times a day before meals  insulin lispro Injectable (HumaLOG) 6 Unit(s) SubCutaneous before breakfast  insulin lispro Injectable (HumaLOG) 6 Unit(s) SubCutaneous before lunch  insulin lispro Injectable (HumaLOG) 6 Unit(s) SubCutaneous before dinner  metoprolol tartrate 25 milliGRAM(s) Oral two times a day  sodium chloride 0.45%. 1000 milliLiter(s) (50 mL/Hr) IV Continuous <Continuous>  vancomycin  IVPB 1000 milliGRAM(s) IV Intermittent every 12 hours    MEDICATIONS  (PRN):  dextrose 40% Gel 15 Gram(s) Oral once PRN Blood Glucose LESS THAN 70 milliGRAM(s)/deciliter  glucagon  Injectable 1 milliGRAM(s) IntraMuscular once PRN Glucose LESS THAN 70 milligrams/deciliter    Vital Signs Last 24 Hrs  T(F): 98.4 (2020 05:00), Max: 98.4 (15 Jul 2020 20:00)  HR: 76 (2020 05:00) (72 - 84)  BP: 145/67 (2020 05:00) (132/61 - 177/91)  RR: 22 (2020 05:00) (12 - 28)  SpO2: 100% (2020 05:00) (91% - 100%)  I&O's Summary    15 Jul 2020 07:01  -  2020 07:00  --------------------------------------------------------  IN: 2114.6 mL / OUT: 1301 mL / NET: 813.6 mL      PHYSICAL EXAM:  General: NAD, A/O x 2  ENT: MMM  Neck: Supple, No JVD  Lungs: Clear to auscultation bilaterally, Non labored breathing   Cardio: RRR, S1/S2, No murmurs  Abdomen: Soft, Nontender, Nondistended; Bowel sounds present  Extremities: No calf tenderness, No pitting edema    LABS:                        15.2   13.80 )-----------( 214      ( 15 Jul 2020 05:30 )             44.9     07-15    149  |  115  |  25  ----------------------------<  193  4.8   |  29  |  0.78    Ca    8.7      15 Jul 2020 11:25  Phos  3.1     07-15  Mg     2.2     07-15    TPro  6.8  /  Alb  3.0  /  TBili  0.4  /  DBili  x   /  AST  25  /  ALT  89  /  AlkPhos  166  07-15    eGFR if Non African American: 96 mL/min/1.73M2 (07-15-20 @ 11:25)  eGFR if African American: 111 mL/min/1.73M2 (07-15-20 @ 11:25)    PT/INR - ( 2020 23:00 )   PT: 11.7 sec;   INR: 0.97 ratio         PTT - ( 2020 23:00 )  PTT:29.6 sec  Lactate, Blood: 1.8 mmol/L (07-15 @ 05:30)  Lactate, Blood: 1.5 mmol/L ( @ 23:00)  Lactate, Blood: 2.5 mmol/L ( @ 20:20)    CARDIAC MARKERS ( 15 Jul 2020 05:30 )  <.017 ng/mL / x     / x     / x     / x      CARDIAC MARKERS ( 2020 20:20 )  <.017 ng/mL / x     / x     / x     / x          07-15 Chol 195 mg/dL  mg/dL HDL 50 mg/dL Trig 170 mg/dL  TSH 2.85   TSH with FT4 reflex --  Total T3 --    20:20 - VBG - pH: 7.35  | pCO2: 35    | pO2: 49    | Lactate:                  POCT Blood Glucose.: 286 mg/dL (15 Jul 2020 21:50)  POCT Blood Glucose.: 221 mg/dL (15 Jul 2020 17:47)  POCT Blood Glucose.: 171 mg/dL (15 Jul 2020 16:47)  POCT Blood Glucose.: 193 mg/dL (15 Jul 2020 15:47)  POCT Blood Glucose.: 151 mg/dL (15 Jul 2020 14:14)  POCT Blood Glucose.: 161 mg/dL (15 Jul 2020 13:07)  POCT Blood Glucose.: 166 mg/dL (15 Jul 2020 12:15)  POCT Blood Glucose.: 191 mg/dL (15 Jul 2020 11:14)  POCT Blood Glucose.: 161 mg/dL (15 Jul 2020 09:54)  POCT Blood Glucose.: 202 mg/dL (15 Jul 2020 08:56)  POCT Blood Glucose.: 196 mg/dL (15 Jul 2020 07:56)      Urinalysis Basic - ( 2020 20:20 )    Color: Yellow / Appearance: Clear / S.010 / pH: x  Gluc: x / Ketone: Trace  / Bili: Negative / Urobili: Negative   Blood: x / Protein: 30 mg/dL / Nitrite: Negative   Leuk Esterase: Negative / RBC: 0-4 /HPF / WBC Negative /HPF   Sq Epi: x / Non Sq Epi: Neg.-Few / Bacteria: Few /HPF        Culture - Urine (collected 2020 20:20)  Source: .Urine Clean Catch (Midstream)  Final Report (2020 02:52):    <10,000 CFU/mL Normal Urogenital Dolores    Culture - Blood (collected 2020 20:20)  Source: .Blood Blood-Peripheral  Gram Stain (15 Jul 2020 23:07):    Growth in aerobic bottle: Gram Positive Cocci in Clusters  Preliminary Report (15 Jul 2020 23:08):    Growth in aerobic bottle: Gram Positive Cocci in Clusters    Culture - Blood (collected 2020 20:20)  Source: .Blood Blood-Peripheral  Gram Stain (15 Jul 2020 18:41):    Growth in anaerobic bottle: Gram Positive Cocci in Clusters    Growth in aerobic bottle: Gram Positive Cocci in Clusters  Preliminary Report (15 Jul 2020 18:41):    Growth in anaerobic bottle: Gram Positive Cocci in Clusters    Growth in aerobic bottle: Gram Positive Cocci in Clusters    "Due to technical problems, Proteus sp. will Not be reported as part of    the BCID panel until further notice"    ***Blood Panel PCR results on this specimen are available    approximately 3 hours after the Gram stain result.***    Gram stain, PCR, and/or culture results may not always    correspond due to difference in methodologies.    ************************************************************    This PCR assay was performed using [employer].    The following targets are tested for: Enterococcus,    vancomycin resistant enterococci, Listeria monocytogenes,    coagulase negative staphylococci, S. aureus,    methicillin resistant S. aureus, Streptococcus agalactiae    (Group B), S. pneumoniae, S. pyogenes (Group A),    Acinetobacter baumannii, Enterobacter cloacae, E. coli,    Klebsiella oxytoca, K. pneumoniae, Proteus sp.,    Serratia marcescens, Haemophilus influenzae,    Neisseria meningitidis, Pseudomonas aeruginosa, Candida    albicans, C. glabrata, C krusei, C parapsilosis,    C. tropicalis and the KPC resistance gene.  Organism: Blood Culture PCR (15 Jul 2020 18:13)  Organism: Blood Culture PCR (15 Jul 2020 18:13)      -  Coagulase negative Staphylococcus: Detec      Method Type: PCR      RADIOLOGY & ADDITIONAL TESTS:    Care Discussed with Consultants/Other Providers: Patient is a 64y old  Male who presents with a chief complaint of DKA and afib with RVR.    Patient seen and examined at bedside.  Pt states did not sleep well last pm-- too much noise.  Pt doing well today without complaints.  Pt states would like to eat today-- says he hasent eaten since he got here.      ALLERGIES:  No Known Allergies    MEDICATIONS  (STANDING):  atorvastatin 10 milliGRAM(s) Oral at bedtime  chlorhexidine 2% Cloths 1 Application(s) Topical <User Schedule>  dextrose 5%. 1000 milliLiter(s) (50 mL/Hr) IV Continuous <Continuous>  dextrose 50% Injectable 12.5 Gram(s) IV Push once  dextrose 50% Injectable 25 Gram(s) IV Push once  dextrose 50% Injectable 25 Gram(s) IV Push once  insulin glargine Injectable (LANTUS) 28 Unit(s) SubCutaneous at bedtime  insulin lispro (HumaLOG) corrective regimen sliding scale   SubCutaneous three times a day before meals  insulin lispro Injectable (HumaLOG) 6 Unit(s) SubCutaneous before breakfast  insulin lispro Injectable (HumaLOG) 6 Unit(s) SubCutaneous before lunch  insulin lispro Injectable (HumaLOG) 6 Unit(s) SubCutaneous before dinner  metoprolol tartrate 25 milliGRAM(s) Oral two times a day  sodium chloride 0.45%. 1000 milliLiter(s) (50 mL/Hr) IV Continuous <Continuous>  vancomycin  IVPB 1000 milliGRAM(s) IV Intermittent every 12 hours    MEDICATIONS  (PRN):  dextrose 40% Gel 15 Gram(s) Oral once PRN Blood Glucose LESS THAN 70 milliGRAM(s)/deciliter  glucagon  Injectable 1 milliGRAM(s) IntraMuscular once PRN Glucose LESS THAN 70 milligrams/deciliter    Vital Signs Last 24 Hrs  T(F): 98.4 (2020 05:00), Max: 98.4 (15 Jul 2020 20:00)  HR: 76 (2020 05:00) (72 - 84)  BP: 145/67 (2020 05:00) (132/61 - 177/91)  RR: 22 (2020 05:00) (12 - 28)  SpO2: 100% (2020 05:00) (91% - 100%)  I&O's Summary    15 Jul 2020 07:01  -  2020 07:00  --------------------------------------------------------  IN: 2114.6 mL / OUT: 1301 mL / NET: 813.6 mL      PHYSICAL EXAM:  General: NAD, A/O x 2  ENT: MMM  Neck: Supple, No JVD  Lungs: Clear to auscultation bilaterally, Non labored breathing   Cardio: RRR, S1/S2, No murmurs  Abdomen: Soft, Nontender, Nondistended; Bowel sounds present  Extremities: No calf tenderness, No pitting edema    LABS:                        15.2   13.80 )-----------( 214      ( 15 Jul 2020 05:30 )             44.9     07-15    149  |  115  |  25  ----------------------------<  193  4.8   |  29  |  0.78    Ca    8.7      15 Jul 2020 11:25  Phos  3.1     07-15  Mg     2.2     07-15    TPro  6.8  /  Alb  3.0  /  TBili  0.4  /  DBili  x   /  AST  25  /  ALT  89  /  AlkPhos  166  07-15    eGFR if Non African American: 96 mL/min/1.73M2 (07-15-20 @ 11:25)  eGFR if African American: 111 mL/min/1.73M2 (07-15-20 @ 11:25)    PT/INR - ( 2020 23:00 )   PT: 11.7 sec;   INR: 0.97 ratio         PTT - ( 2020 23:00 )  PTT:29.6 sec  Lactate, Blood: 1.8 mmol/L (07-15 @ 05:30)  Lactate, Blood: 1.5 mmol/L ( @ 23:00)  Lactate, Blood: 2.5 mmol/L ( @ 20:20)    CARDIAC MARKERS ( 15 Jul 2020 05:30 )  <.017 ng/mL / x     / x     / x     / x      CARDIAC MARKERS ( 2020 20:20 )  <.017 ng/mL / x     / x     / x     / x          07-15 Chol 195 mg/dL  mg/dL HDL 50 mg/dL Trig 170 mg/dL  TSH 2.85   TSH with FT4 reflex --  Total T3 --    20:20 - VBG - pH: 7.35  | pCO2: 35    | pO2: 49    | Lactate:                  POCT Blood Glucose.: 286 mg/dL (15 Jul 2020 21:50)  POCT Blood Glucose.: 221 mg/dL (15 Jul 2020 17:47)  POCT Blood Glucose.: 171 mg/dL (15 Jul 2020 16:47)  POCT Blood Glucose.: 193 mg/dL (15 Jul 2020 15:47)  POCT Blood Glucose.: 151 mg/dL (15 Jul 2020 14:14)  POCT Blood Glucose.: 161 mg/dL (15 Jul 2020 13:07)  POCT Blood Glucose.: 166 mg/dL (15 Jul 2020 12:15)  POCT Blood Glucose.: 191 mg/dL (15 Jul 2020 11:14)  POCT Blood Glucose.: 161 mg/dL (15 Jul 2020 09:54)  POCT Blood Glucose.: 202 mg/dL (15 Jul 2020 08:56)  POCT Blood Glucose.: 196 mg/dL (15 Jul 2020 07:56)      Urinalysis Basic - ( 2020 20:20 )    Color: Yellow / Appearance: Clear / S.010 / pH: x  Gluc: x / Ketone: Trace  / Bili: Negative / Urobili: Negative   Blood: x / Protein: 30 mg/dL / Nitrite: Negative   Leuk Esterase: Negative / RBC: 0-4 /HPF / WBC Negative /HPF   Sq Epi: x / Non Sq Epi: Neg.-Few / Bacteria: Few /HPF        Culture - Urine (collected 2020 20:20)  Source: .Urine Clean Catch (Midstream)  Final Report (2020 02:52):    <10,000 CFU/mL Normal Urogenital Dolores    Culture - Blood (collected 2020 20:20)  Source: .Blood Blood-Peripheral  Gram Stain (15 Jul 2020 23:07):    Growth in aerobic bottle: Gram Positive Cocci in Clusters  Preliminary Report (15 Jul 2020 23:08):    Growth in aerobic bottle: Gram Positive Cocci in Clusters    Culture - Blood (collected 2020 20:20)  Source: .Blood Blood-Peripheral  Gram Stain (15 Jul 2020 18:41):    Growth in anaerobic bottle: Gram Positive Cocci in Clusters    Growth in aerobic bottle: Gram Positive Cocci in Clusters  Preliminary Report (15 Jul 2020 18:41):    Growth in anaerobic bottle: Gram Positive Cocci in Clusters    Growth in aerobic bottle: Gram Positive Cocci in Clusters    "Due to technical problems, Proteus sp. will Not be reported as part of    the BCID panel until further notice"    ***Blood Panel PCR results on this specimen are available    approximately 3 hours after the Gram stain result.***    Gram stain, PCR, and/or culture results may not always    correspond due to difference in methodologies.    ************************************************************    This PCR assay was performed using [employer].    The following targets are tested for: Enterococcus,    vancomycin resistant enterococci, Listeria monocytogenes,    coagulase negative staphylococci, S. aureus,    methicillin resistant S. aureus, Streptococcus agalactiae    (Group B), S. pneumoniae, S. pyogenes (Group A),    Acinetobacter baumannii, Enterobacter cloacae, E. coli,    Klebsiella oxytoca, K. pneumoniae, Proteus sp.,    Serratia marcescens, Haemophilus influenzae,    Neisseria meningitidis, Pseudomonas aeruginosa, Candida    albicans, C. glabrata, C krusei, C parapsilosis,    C. tropicalis and the KPC resistance gene.  Organism: Blood Culture PCR (15 Jul 2020 18:13)  Organism: Blood Culture PCR (15 Jul 2020 18:13)      -  Coagulase negative Staphylococcus: Detec      Method Type: PCR      RADIOLOGY & ADDITIONAL TESTS:    Care Discussed with Consultants/Other Providers:

## 2020-07-16 NOTE — PROGRESS NOTE ADULT - ATTENDING COMMENTS
afib  would consider patient for anticoagulation if ok from neuro standpoint. concerned about a "linear vessel" on a ct scan of head. afib  would consider patient for anticoagulation if ok from neuro standpoint. concerned about a "linear vessel" on a ct scan of head.    positive blood culture  concern of procurement contaminant vs endovascular sepsis. await repeat cultures of blood. patient is non toxic in appearance.

## 2020-07-16 NOTE — PROGRESS NOTE ADULT - ASSESSMENT
Physical Examination:  GENERAL:               Alert, Oriented, No acute distress.    PULM:                     Bilateral air entry, Clear to auscultation bilaterally, no significant sputum production, No Rales, No Rhonchi, No Wheezing  CVS:                         S1, S2,  No Murmur  ABD:                        Soft, nondistended, nontender, normoactive bowel sounds,   NEURO:                  Alert, oriented, interactive, nonfocal, follows commands  PSYC:                      Calm, + Insight.        Assessment  Syncope Likely due to Afib with RVR and uncontrolled DM2 with hyperglycemia / HHS  Hypernatremia/Dehydration Resolving  Possible SDH on CT - artifact  Bacteremia - Coag Neg Staph - likely Contamination  underlying DM2, HTN,       Plan  Rate logroll as per Cardio  consider to start a/c   Can consider to d/c abx as coag neg staph  Endo evaluated patient and changed insulin regimen.  DM education      Case d/w Dr. Palomino this AM  Disposition: continue care on tele.     Goals of Care: Full code

## 2020-07-16 NOTE — PROGRESS NOTE ADULT - SUBJECTIVE AND OBJECTIVE BOX
Follow-up Critical Care Progress Note  Chief Complaint : Hyperglycemia    pt transferred to medical floor overnight     pt seen by komal  pt feels well no cp, sob, palp , nv  HR controlled  pt off insulin drip from overnight        Allergies :No Known Allergies      PAST MEDICAL & SURGICAL HISTORY:  Shoulder pain, left  Dyslipidemia  Hypertension  Type 2 diabetes mellitus  S/P knee surgery: left, 30 years ago-left      Medications:  MEDICATIONS  (STANDING):  atorvastatin 10 milliGRAM(s) Oral at bedtime  chlorhexidine 2% Cloths 1 Application(s) Topical <User Schedule>  dextrose 5%. 1000 milliLiter(s) (50 mL/Hr) IV Continuous <Continuous>  dextrose 50% Injectable 12.5 Gram(s) IV Push once  dextrose 50% Injectable 25 Gram(s) IV Push once  dextrose 50% Injectable 25 Gram(s) IV Push once  insulin glargine Injectable (LANTUS) 30 Unit(s) SubCutaneous at bedtime  insulin lispro (HumaLOG) corrective regimen sliding scale   SubCutaneous three times a day before meals  insulin lispro Injectable (HumaLOG) 6 Unit(s) SubCutaneous before breakfast  insulin lispro Injectable (HumaLOG) 6 Unit(s) SubCutaneous before lunch  insulin lispro Injectable (HumaLOG) 6 Unit(s) SubCutaneous before dinner  metoprolol tartrate 25 milliGRAM(s) Oral two times a day  sodium chloride 0.45%. 1000 milliLiter(s) (50 mL/Hr) IV Continuous <Continuous>  vancomycin  IVPB 1000 milliGRAM(s) IV Intermittent every 12 hours    MEDICATIONS  (PRN):  dextrose 40% Gel 15 Gram(s) Oral once PRN Blood Glucose LESS THAN 70 milliGRAM(s)/deciliter  glucagon  Injectable 1 milliGRAM(s) IntraMuscular once PRN Glucose LESS THAN 70 milligrams/deciliter      LABS:                        13.8   10.99 )-----------( 174      ( 2020 08:10 )             40.8     07-16    142  |  109<H>  |  16  ----------------------------<  276<H>  4.0   |  25  |  0.72    Ca    8.1<L>      2020 08:10  Phos  3.1     07-15  Mg     2.2     07-15    TPro  6.8  /  Alb  3.0<L>  /  TBili  0.4  /  DBili  x   /  AST  25  /  ALT  89<H>  /  AlkPhos  166<H>  07-15      CARDIAC MARKERS ( 15 Jul 2020 05:30 )  <.017 ng/mL / x     / x     / x     / x      CARDIAC MARKERS ( 2020 20:20 )  <.017 ng/mL / x     / x     / x     / x            PT/INR - ( 2020 23:00 )   PT: 11.7 sec;   INR: 0.97 ratio         PTT - ( 2020 23:00 )  PTT:29.6 sec  Urinalysis Basic - ( 2020 20:20 )    Color: Yellow / Appearance: Clear / S.010 / pH: x  Gluc: x / Ketone: Trace  / Bili: Negative / Urobili: Negative   Blood: x / Protein: 30 mg/dL / Nitrite: Negative   Leuk Esterase: Negative / RBC: 0-4 /HPF / WBC Negative /HPF   Sq Epi: x / Non Sq Epi: Neg.-Few / Bacteria: Few /HPF      Glucose Trend  20 @ 12:40   -  -- -- 284<H>  20 @ 08:10   -  -- 276<H> --  20 @ 07:53   -  -- -- 263<H>  07-15-20 @ 21:50   -  -- -- 286<H>  07-15-20 @ 17:47   -  -- -- 221<H>  07-15-20 @ 16:47   -  -- -- 171<H>  07-15-20 @ 15:47   -  -- -- 193<H>  07-15-20 @ 14:14   -  -- -- 151<H>  07-15-20 @ 13:07   -  -- -- 161<H>  07-15-20 @ 12:15   -  -- -- 166<H>      < from: CT Head No Cont (07.15.20 @ 10:35) >    IMPRESSION:   1.  A left frontal extra-axial density is unchanged. This has a linear appearance, suggesting a vessel. No evidence of subdural hematoma.  2.  Right parietal scalp soft tissue swelling.      < end of copied text >        CULTURES: (if applicable)    Culture - Urine (collected 20 @ 20:20)  Source: .Urine Clean Catch (Midstream)  Final Report (20 @ 02:52):    <10,000 CFU/mL Normal Urogenital Dolores    Culture - Blood (collected 20 @ 20:20)  Source: .Blood Blood-Peripheral  Gram Stain (07-15-20 @ 23:07):    Growth in aerobic bottle: Gram Positive Cocci in Clusters  Preliminary Report (07-15-20 @ 23:08):    Growth in aerobic bottle: Gram Positive Cocci in Clusters    Culture - Blood (collected 20 @ 20:20)  Source: .Blood Blood-Peripheral  Gram Stain (07-15-20 @ 18:41):    Growth in anaerobic bottle: Gram Positive Cocci in Clusters    Growth in aerobic bottle: Gram Positive Cocci in Clusters  Preliminary Report (07-15-20 @ 18:41):    Growth in anaerobic bottle: Gram Positive Cocci in Clusters    Growth in aerobic bottle: Gram Positive Cocci in Clusters    "Due to technical problems, Proteus sp. will Not be reported as part of    the BCID panel until further notice"    ***Blood Panel PCR results on this specimen are available    approximately 3 hours after the Gram stain result.***    Gram stain, PCR, and/or culture results may not always    correspond due to difference in methodologies.    ************************************************************    This PCR assay was performed using [facility].    The following targets are tested for: Enterococcus,    vancomycin resistant enterococci, Listeria monocytogenes,    coagulase negative staphylococci, S. aureus,    methicillin resistant S. aureus, Streptococcus agalactiae    (Group B), S. pneumoniae, S. pyogenes (Group A),    Acinetobacter baumannii, Enterobacter cloacae, E. coli,    Klebsiella oxytoca, K. pneumoniae, Proteus sp.,    Serratia marcescens, Haemophilus influenzae,    Neisseria meningitidis, Pseudomonas aeruginosa, Candida    albicans, C. glabrata, C krusei, C parapsilosis,    C. tropicalis and the KPC resistance gene.  Organism: Blood Culture PCR (07-15-20 @ 18:13)  Organism: Blood Culture PCR (07-15-20 @ 18:13)      -  Coagulase negative Staphylococcus: Detec      Method Type: PCR      VITALS:  T(C): 36.5 (20 @ 08:49), Max: 36.9 (07-15-20 @ 20:00)  T(F): 97.7 (20 @ 08:49), Max: 98.4 (07-15-20 @ 20:00)  HR: 70 (20 @ 08:49) (70 - 84)  BP: 168/90 (20 @ 08:49) (144/100 - 177/91)  BP(mean): 103 (07-15-20 @ 20:00) (100 - 113)  ABP: --  ABP(mean): --  RR: 16 (20 @ 08:49) (15 - 28)  SpO2: 94% (20 08:49) (94% - 100%)  CVP(mm Hg): --  CVP(cm H2O): --    Ins and Outs     07-15-20 @ 07:01  -  20 @ 07:00  --------------------------------------------------------  IN: 2114.6 mL / OUT: 1301 mL / NET: 813.6 mL        Height (cm): 193 (07-15-20 @ 01:55)  Weight (kg): 112 (07-15-20 @ 01:55)  BMI (kg/m2): 30.1 (07-15-20 @ 01:55)        I&O's Detail    15 Jul 2020 07:  -  2020 07:00  --------------------------------------------------------  IN:    insulin regular Infusion: 64.6 mL    lactated ringers: 1800 mL    Solution: 250 mL  Total IN: 2114.6 mL    OUT:    Stool: 1 mL    Voided: 1300 mL  Total OUT: 1301 mL    Total NET: 813.6 mL

## 2020-07-16 NOTE — PHYSICAL THERAPY INITIAL EVALUATION ADULT - ADDITIONAL COMMENTS
pt lives alone in private house, [address] w/2 rails, 1 flight of stairs which pt does not use. pt states he is independent w/ambulation and ADL's and drives

## 2020-07-17 ENCOUNTER — TRANSCRIPTION ENCOUNTER (OUTPATIENT)
Age: 65
End: 2020-07-17

## 2020-07-17 DIAGNOSIS — R55 SYNCOPE AND COLLAPSE: ICD-10-CM

## 2020-07-17 LAB
ANION GAP SERPL CALC-SCNC: 11 MMOL/L — SIGNIFICANT CHANGE UP (ref 5–17)
BUN SERPL-MCNC: 14 MG/DL — SIGNIFICANT CHANGE UP (ref 7–23)
CALCIUM SERPL-MCNC: 8.3 MG/DL — LOW (ref 8.4–10.5)
CHLORIDE SERPL-SCNC: 104 MMOL/L — SIGNIFICANT CHANGE UP (ref 96–108)
CO2 SERPL-SCNC: 23 MMOL/L — SIGNIFICANT CHANGE UP (ref 22–31)
CREAT SERPL-MCNC: 0.66 MG/DL — SIGNIFICANT CHANGE UP (ref 0.5–1.3)
GLUCOSE BLDC GLUCOMTR-MCNC: 176 MG/DL — HIGH (ref 70–99)
GLUCOSE BLDC GLUCOMTR-MCNC: 184 MG/DL — HIGH (ref 70–99)
GLUCOSE BLDC GLUCOMTR-MCNC: 185 MG/DL — HIGH (ref 70–99)
GLUCOSE BLDC GLUCOMTR-MCNC: 221 MG/DL — HIGH (ref 70–99)
GLUCOSE SERPL-MCNC: 183 MG/DL — HIGH (ref 70–99)
HCT VFR BLD CALC: 43.2 % — SIGNIFICANT CHANGE UP (ref 39–50)
HGB BLD-MCNC: 14.8 G/DL — SIGNIFICANT CHANGE UP (ref 13–17)
MCHC RBC-ENTMCNC: 29.9 PG — SIGNIFICANT CHANGE UP (ref 27–34)
MCHC RBC-ENTMCNC: 34.3 GM/DL — SIGNIFICANT CHANGE UP (ref 32–36)
MCV RBC AUTO: 87.3 FL — SIGNIFICANT CHANGE UP (ref 80–100)
NRBC # BLD: 0 /100 WBCS — SIGNIFICANT CHANGE UP (ref 0–0)
PLATELET # BLD AUTO: 168 K/UL — SIGNIFICANT CHANGE UP (ref 150–400)
POTASSIUM SERPL-MCNC: 3.8 MMOL/L — SIGNIFICANT CHANGE UP (ref 3.5–5.3)
POTASSIUM SERPL-SCNC: 3.8 MMOL/L — SIGNIFICANT CHANGE UP (ref 3.5–5.3)
RBC # BLD: 4.95 M/UL — SIGNIFICANT CHANGE UP (ref 4.2–5.8)
RBC # FLD: 11.7 % — SIGNIFICANT CHANGE UP (ref 10.3–14.5)
SODIUM SERPL-SCNC: 138 MMOL/L — SIGNIFICANT CHANGE UP (ref 135–145)
WBC # BLD: 8.79 K/UL — SIGNIFICANT CHANGE UP (ref 3.8–10.5)
WBC # FLD AUTO: 8.79 K/UL — SIGNIFICANT CHANGE UP (ref 3.8–10.5)

## 2020-07-17 PROCEDURE — 99232 SBSQ HOSP IP/OBS MODERATE 35: CPT

## 2020-07-17 PROCEDURE — 99221 1ST HOSP IP/OBS SF/LOW 40: CPT

## 2020-07-17 RX ORDER — INSULIN GLARGINE 100 [IU]/ML
35 INJECTION, SOLUTION SUBCUTANEOUS AT BEDTIME
Refills: 0 | Status: DISCONTINUED | OUTPATIENT
Start: 2020-07-17 | End: 2020-07-18

## 2020-07-17 RX ORDER — HUMAN INSULIN 100 [IU]/ML
30 INJECTION, SUSPENSION SUBCUTANEOUS
Qty: 10 | Refills: 0
Start: 2020-07-17

## 2020-07-17 RX ORDER — ISOPROPYL ALCOHOL, BENZOCAINE .7; .06 ML/ML; ML/ML
1 SWAB TOPICAL
Qty: 100 | Refills: 1
Start: 2020-07-17 | End: 2020-09-04

## 2020-07-17 RX ORDER — INSULIN GLARGINE 100 [IU]/ML
32 INJECTION, SOLUTION SUBCUTANEOUS AT BEDTIME
Refills: 0 | Status: DISCONTINUED | OUTPATIENT
Start: 2020-07-17 | End: 2020-07-17

## 2020-07-17 RX ORDER — APIXABAN 2.5 MG/1
5 TABLET, FILM COATED ORAL
Refills: 0 | Status: DISCONTINUED | OUTPATIENT
Start: 2020-07-17 | End: 2020-07-18

## 2020-07-17 RX ADMIN — Medication 4: at 13:14

## 2020-07-17 RX ADMIN — Medication 2: at 17:07

## 2020-07-17 RX ADMIN — Medication 2: at 08:41

## 2020-07-17 RX ADMIN — INSULIN GLARGINE 35 UNIT(S): 100 INJECTION, SOLUTION SUBCUTANEOUS at 21:04

## 2020-07-17 RX ADMIN — ATORVASTATIN CALCIUM 10 MILLIGRAM(S): 80 TABLET, FILM COATED ORAL at 21:03

## 2020-07-17 RX ADMIN — Medication 6 UNIT(S): at 13:14

## 2020-07-17 RX ADMIN — Medication 25 MILLIGRAM(S): at 05:51

## 2020-07-17 RX ADMIN — APIXABAN 5 MILLIGRAM(S): 2.5 TABLET, FILM COATED ORAL at 19:01

## 2020-07-17 RX ADMIN — Medication 25 MILLIGRAM(S): at 17:06

## 2020-07-17 RX ADMIN — Medication 6 UNIT(S): at 17:07

## 2020-07-17 NOTE — CONSULT NOTE ADULT - REASON FOR ADMISSION
DKA and afib with RVR

## 2020-07-17 NOTE — DISCHARGE NOTE PROVIDER - HOSPITAL COURSE
Hospital Course    65 yo male with DM II, HTN, and HLD admitted 7/14 with a syncopal event. Found to be in A fib with RVR and DKA.  For A fib w RVR, rate controlled and started on     Eliquis.  For DKA, endocrinology consulted. Pt initially placed on insulin gtt and titrated off. FS closely monitored and discharged home with __________    Also with 3/4 coag negative staph on blood cx. ID consulted and recommended holding abx due to suspected contaminant. Repeat blood cx negative. Medically stable for discharge home        Source of Infection:    Antibiotic / Last Day:N/A        Palliative Care / Advanced Care Planning    Code Status:full    Patient/Family agreeable to Hospice/Palliative (Y/N)? N    Summary of Goals of Care Conversation: full code        Discharging Provider:  MITCHELL Parks    Contact Info: Cell [phone] - Please call with any questions or concerns.        Outpatient Provider:        Signout given to SNF Provider: Hospital Course    65 yo male with DM II, HTN, and HLD admitted 7/14 with a syncopal event. Found to be in A fib with RVR and DKA.  For A fib w RVR, rate controlled and started on     Eliquis.  For DKA, endocrinology consulted. Pt initially placed on insulin gtt and titrated off. FS closely monitored and discharged home with Humulin N 30 units BID morning and bedtime as well as Metformin 1,000 mg BID        Also with 3/4 coag negative staph on blood cx. ID consulted and recommended holding abx due to suspected contaminant. Repeat blood cx negative. Medically stable for discharge home with outpatient followup         Source of Infection:    Antibiotic / Last Day:N/A        Palliative Care / Advanced Care Planning    Code Status:full    Patient/Family agreeable to Hospice/Palliative (Y/N)? N    Summary of Goals of Care Conversation: full code        Discharging Provider:  MITCHELL Parks    Contact Info: Cell [phone] - Please call with any questions or concerns. Hospital Course    65 yo male with DM II, HTN, and HLD admitted 7/14 with a syncopal event. Found to be in A fib with RVR and DKA.  For A fib w RVR, rate controlled and started on     Eliquis.  For DKA, endocrinology consulted. Pt initially placed on insulin gtt and titrated off. FS closely monitored and discharged home with Humulin N 30 units BID morning and bedtime as well as Metformin 1,000 mg BID        Also with 3/4 coag negative staph on blood cx. ID consulted and recommended holding abx due to suspected contaminant. Repeat blood cx negative. Medically stable for discharge home with outpatient followup         PMD Dr Meza made aware of discharge         Source of Infection:    Antibiotic / Last Day:N/A        Palliative Care / Advanced Care Planning    Code Status:full    Patient/Family agreeable to Hospice/Palliative (Y/N)? N    Summary of Goals of Care Conversation: full code        Discharging Provider:  MITCHELL Parks    Contact Info: Cell [phone] - Please call with any questions or concerns.

## 2020-07-17 NOTE — PROGRESS NOTE ADULT - ASSESSMENT
63 yo male with DM, HTN, and HLD admitted 7/14 with a syncopal event.  As per the patient he was well prior to the syncopal event.He was treated for DKA and rapid A Fib.  Staph hominis is  growing in 3/4 bottles,  he has no foreign material and I am concerned that it may reflect procurement contaminants from ER blood cultures.  He has no localizing signs of infection, history of fever or chills, and while Coag  negative staph can be a pathogen it is also a common contaminant.  Repeat blood cultures are pending.Antibiotics stopped 7/16  Suggest:  1.hold additional antibiotics  2.repeat blood cultures pending  3.low threshold for a RENATA if repeat bottles turn positive  4.follow fever curve and labs  5.Additional w/u pending clinical course.At least on clinical grounds my clinical suspicion for infection is low.

## 2020-07-17 NOTE — PROGRESS NOTE ADULT - SUBJECTIVE AND OBJECTIVE BOX
CC: f/u for staph hominis in blood cultures    Patient reports: no specific complaints today, frustrated by being in hospital    REVIEW OF SYSTEMS:  All other review of systems negative (Comprehensive ROS)    Antimicrobials Day #  :s/p vanco and cefepime    Other Medications Reviewed  MEDICATIONS  (STANDING):  apixaban 5 milliGRAM(s) Oral two times a day  atorvastatin 10 milliGRAM(s) Oral at bedtime  chlorhexidine 2% Cloths 1 Application(s) Topical <User Schedule>  dextrose 5%. 1000 milliLiter(s) (50 mL/Hr) IV Continuous <Continuous>  dextrose 50% Injectable 12.5 Gram(s) IV Push once  dextrose 50% Injectable 25 Gram(s) IV Push once  dextrose 50% Injectable 25 Gram(s) IV Push once  insulin glargine Injectable (LANTUS) 32 Unit(s) SubCutaneous at bedtime  insulin lispro (HumaLOG) corrective regimen sliding scale   SubCutaneous three times a day before meals  insulin lispro Injectable (HumaLOG) 6 Unit(s) SubCutaneous before breakfast  insulin lispro Injectable (HumaLOG) 6 Unit(s) SubCutaneous before lunch  insulin lispro Injectable (HumaLOG) 6 Unit(s) SubCutaneous before dinner  metoprolol tartrate 25 milliGRAM(s) Oral two times a day    T(F): 98.4 (07-17-20 @ 05:50), Max: 98.9 (07-16-20 @ 20:26)  HR: 80 (07-17-20 @ 05:50)  BP: 185/89 (07-17-20 @ 05:50)  RR: 16 (07-17-20 @ 05:50)  SpO2: 95% (07-17-20 @ 05:50)  Wt(kg): --    PHYSICAL EXAM:  General: alert, no acute distress  Eyes:  anicteric, no conjunctival injection, no discharge  Oropharynx: no lesions or injection 	  Neck: supple, without adenopathy  Lungs: clear to auscultation  Heart: regular rate and rhythm; no murmur, rubs or gallops  Abdomen: soft, nondistended, nontender, without mass or organomegaly  Skin: no lesions  Extremities: no clubbing, cyanosis, or edema  Neurologic: alert, oriented, moves all extremities    LAB RESULTS:                        14.8   8.79  )-----------( 168      ( 17 Jul 2020 05:30 )             43.2     07-17    138  |  104  |  14  ----------------------------<  183<H>  3.8   |  23  |  0.66    Ca    8.3<L>      17 Jul 2020 05:30  Phos  3.1     07-15  Mg     2.2     07-15          MICROBIOLOGY:  RECENT CULTURES:  07-14 @ 20:20 .Blood Blood-Peripheral Blood Culture PCR    Growth in aerobic and anaerobic bottles: Staphylococcus hominis    .,      Growth in anaerobic bottle: Gram Positive Cocci in Clusters  Growth in aerobic bottle: Gram Positive Cocci in Clusters        RADIOLOGY REVIEWED:

## 2020-07-17 NOTE — CONSULT NOTE ADULT - SUBJECTIVE AND OBJECTIVE BOX
64 yr-old man had brief faint while grocery shopping 3 days ago and found to have DKA and a-fib. CT of head on 7/14 noted  a "sliver" of hyperdensity in left frontal extraaxial region and repeat scan next day read by radiologist as a blood vessel and not a subdural.     CT of head reviewed and agree no evidence of SDH. Small right parietal scalp hematoma noted.    Patient currently sleeping and when awoken refuses full examination requesting to go back to sleep.
Consult dictatedm Pt. examined and discussed with  PT.  64 ys. old male admitted with AF. syncope , mild DKA with Uncontrolled T2 DM with HbA1c 13.7.  Pt. is non -compliant with his insulin, diet and therapy for several years.
HPI:   Patient is a 64y male with a past history of DM,HLD, and HTN who was admitted to [facility] on  after syncopal event in supermarket, now known to have coag negative staph in blood.He was in DKA with blood glucose of 800. He was also in rapid A Fib and has converted to NSR.He denies any fever or chills at home.No localizing complaints in the hospital.No recent infections or antibiotic use.He has received cefepime and vancomycin at [facility].    REVIEW OF SYSTEMS:  All other review of systems negative (Comprehensive ROS)    PAST MEDICAL & SURGICAL HISTORY:  Shoulder pain, left  Dyslipidemia  Hypertension  Type 2 diabetes mellitus  S/P knee surgery: left, 30 years ago-left      Allergies    No Known Allergies    Intolerances        Antimicrobials Day #  :day 1-2  vancomycin  IVPB 1000 milliGRAM(s) IV Intermittent every 12 hours    Other Medications:  atorvastatin 10 milliGRAM(s) Oral at bedtime  chlorhexidine 2% Cloths 1 Application(s) Topical <User Schedule>  dextrose 40% Gel 15 Gram(s) Oral once PRN  dextrose 5%. 1000 milliLiter(s) IV Continuous <Continuous>  dextrose 50% Injectable 12.5 Gram(s) IV Push once  dextrose 50% Injectable 25 Gram(s) IV Push once  dextrose 50% Injectable 25 Gram(s) IV Push once  glucagon  Injectable 1 milliGRAM(s) IntraMuscular once PRN  insulin glargine Injectable (LANTUS) 30 Unit(s) SubCutaneous at bedtime  insulin lispro (HumaLOG) corrective regimen sliding scale   SubCutaneous three times a day before meals  insulin lispro Injectable (HumaLOG) 6 Unit(s) SubCutaneous before breakfast  insulin lispro Injectable (HumaLOG) 6 Unit(s) SubCutaneous before lunch  insulin lispro Injectable (HumaLOG) 6 Unit(s) SubCutaneous before dinner  metoprolol tartrate 25 milliGRAM(s) Oral two times a day  sodium chloride 0.45%. 1000 milliLiter(s) IV Continuous <Continuous>      FAMILY HISTORY:  Family history of bone cancer  Family history of colon cancer (Sibling)      SOCIAL HISTORY:  Smoking:  x   ETOH:    x Drug Use: x    Single     T(F): 97.7 (20 @ 08:49), Max: 98.4 (07-15-20 @ 20:00)  HR: 70 (20 @ 08:49)  BP: 168/90 (20 @ 08:49)  RR: 16 (20 @ 08:49)  SpO2: 94% (20 @ 08:49)  Wt(kg): --    PHYSICAL EXAM:  General: alert, no acute distress  Eyes:  anicteric, no conjunctival injection, no discharge  Oropharynx: no lesions or injection 	  Neck: supple, without adenopathy  Lungs: clear to auscultation  Heart: regular rate and rhythm; no murmur, rubs or gallops  Abdomen: soft, nondistended, nontender, without mass or organomegaly  Skin: no lesions  Extremities: no clubbing, cyanosis, or edema  Neurologic: alert, oriented, moves all extremities    LAB RESULTS:                        13.8   10.99 )-----------( 174      ( 2020 08:10 )             40.8     07-16    142  |  109<H>  |  16  ----------------------------<  276<H>  4.0   |  25  |  0.72    Ca    8.1<L>      2020 08:10  Phos  3.1     07-15  Mg     2.2     07-15    TPro  6.8  /  Alb  3.0<L>  /  TBili  0.4  /  DBili  x   /  AST  25  /  ALT  89<H>  /  AlkPhos  166<H>  07-15    LIVER FUNCTIONS - ( 15 Jul 2020 05:30 )  Alb: 3.0 g/dL / Pro: 6.8 g/dL / ALK PHOS: 166 U/L / ALT: 89 U/L / AST: 25 U/L / GGT: x           Urinalysis Basic - ( 2020 20:20 )    Color: Yellow / Appearance: Clear / S.010 / pH: x  Gluc: x / Ketone: Trace  / Bili: Negative / Urobili: Negative   Blood: x / Protein: 30 mg/dL / Nitrite: Negative   Leuk Esterase: Negative / RBC: 0-4 /HPF / WBC Negative /HPF   Sq Epi: x / Non Sq Epi: Neg.-Few / Bacteria: Few /HPF        MICROBIOLOGY:  RECENT CULTURES:   @ 20:20 .Blood Blood-Peripheral Blood Culture PCR    Growth in anaerobic bottle: Gram Positive Cocci in Clusters  Growth in aerobic bottle: Gram Positive Cocci in Clusters    .    Growth in anaerobic bottle: Gram Positive Cocci in Clusters  Growth in aerobic bottle: Gram Positive Cocci in Clusters    Coag negative staph bacteremia 3/4 bottles      RADIOLOGY REVIEWED:  TTE thickened MV leaflets,   < from: CT Head No Cont (07.15.20 @ 10:35) >  IMPRESSION:   1.  A left frontal extra-axial density is unchanged. This has a linear appearance, suggesting a vessel. No evidence of subdural hematoma.  2.  Right parietal scalp soft tissue swelling.    < end of copied text >  < from: Xray Chest 1 View AP/PA (20 @ 21:39) >  IMPRESSION:     No radiographic evidence of acute cardiothoracic disease.     < end of copied text >
Reason for Diabetes Education Consult:  Uncontrolled Type 2 diabetes w/ DKA and A1C of 13.7%    HPI: 65 y/o male with past medical hx of Type 2 Diabetes, HLD, HTN.  Pt of Dr. Prince (PCP) and Dr. Kraft (Endo) who presents to ED s/p syncopal event while grocery shopping. He hit his head on ground but did not lose consciousness. Upon arrival to ED patient was in afib with RVR to 180, hemodynamically stable and was also found to be in DKA with initial blood glucose by "HI" - likely > 600.    upon arrival in ED.  He was given a total of 45 mg of cardizem IVP then started on cardizem gtt with limited improvement.   Pt given Novolin R 10 units in ED, started on insulin drip and transferred to ICU.    Pt states prescriptions filled at [facility] on [facility].  Reports taking insulin 25 units with breakfast and at bedtime (unsure of name or whether a pen or vial/syringe) and Metformin 1,000 mg BID.  States misses PM doses of both 4-5 x week because he "forgets".  Reports keeping insulin in fridge and injects "around my belly button just like te doctor told me"  Reports testing BG twice daily w/ readings 130-140 and sometimes as high as 200.  Denies any BG readings <70 or signs/symptoms of hypoglycemia.  States has BG meter and supplies.      Denies unintentional weight loss and polydipsia but endorses polyuria.    Lives alone.  Sister lives close by.  Pt drives a car.    PAST MEDICAL & SURGICAL HISTORY:  Shoulder pain, left  Dyslipidemia  Hypertension  Type 2 diabetes mellitus  S/P knee surgery: left, 30 years ago-left    FAMILY HISTORY:  Family history of bone cancer  Family history of colon cancer (Sibling)      SH:  Smoking  denies  Etoh: denies  Recreational Drugs: denies    Home Medications:  aspirin 81 mg oral tablet: 1 tab(s) orally once a day (14 Jul 2020 20:54)  atorvastatin 10 mg oral tablet: 1 tab(s) orally once a day (at bedtime) (14 Jul 2020 20:54)  HumaLOG KwikPen 100 units/mL subcutaneous solution:  subcutaneous  (14 Jul 2020 20:54)  metFORMIN 1000 mg oral tablet: 1 tab(s) orally 2 times a day (14 Jul 2020 20:54)  Percocet 5/325 oral tablet: 1- 2 tab(s) orally every 4 hours (14 Jul 2020 20:54)  ramipril 2.5 mg oral tablet:  orally  (14 Jul 2020 20:54)      Current (Non-Endocrine) Meds:  chlorhexidine 2% Cloths 1 Application(s) Topical <User Schedule>  lactated ringers 1000 milliLiter(s) IV Continuous <Continuous>  metoprolol tartrate 25 milliGRAM(s) Oral two times a day      Current Endocrine Meds:   atorvastatin 10 milliGRAM(s) Oral at bedtime  insulin regular Infusion 7 Unit(s)/Hr IV Continuous <Continuous>      History of Medication Compliance:  appears to be non-adherent    Allergies:  No Known Allergies      Height, weight BMI  Height (cm): 193 (07-15 @ 01:55)  Weight (kg): 112 (07-15 @ 01:55)  BMI (kg/m2): 30.1 (07-15 @ 01:55)    Vital Signs Last 24 Hrs  T(C): 36.6 (15 Jul 2020 10:00), Max: 36.7 (14 Jul 2020 21:10)  T(F): 97.9 (15 Jul 2020 10:00), Max: 98 (14 Jul 2020 21:10)  HR: 80 (15 Jul 2020 12:00) (77 - 167)  BP: 146/80 (15 Jul 2020 12:00) (105/71 - 163/89)  BP(mean): 102 (15 Jul 2020 10:00) (79 - 105)  RR: 21 (15 Jul 2020 12:00) (12 - 22)  SpO2: 91% (15 Jul 2020 12:00) (91% - 99%)    A&O x 3, normal affect/mood.    POCT Blood Glucose.: 161 mg/dL (07-15-20 @ 13:07)  POCT Blood Glucose.: 166 mg/dL (07-15-20 @ 12:15)  POCT Blood Glucose.: 191 mg/dL (07-15-20 @ 11:14)  POCT Blood Glucose.: 161 mg/dL (07-15-20 @ 09:54)  POCT Blood Glucose.: 202 mg/dL (07-15-20 @ 08:56)  POCT Blood Glucose.: 196 mg/dL (07-15-20 @ 07:56)  POCT Blood Glucose.: 352 mg/dL (07-15-20 @ 06:51)  POCT Blood Glucose.: 305 mg/dL (07-15-20 @ 05:50)  POCT Blood Glucose.: 448 mg/dL (07-15-20 @ 04:43)  POCT Blood Glucose.: 349 mg/dL (07-15-20 @ 03:46)  POCT Blood Glucose.: 408 mg/dL (07-15-20 @ 02:46)  POCT Blood Glucose.: 500 mg/dL (07-15-20 @ 01:42)  POCT Blood Glucose.: 466 mg/dL (07-15-20 @ 00:14)    LABS:                        15.2   13.80 )-----------( 214      ( 15 Jul 2020 05:30 )             44.9     07-15    149<H>  |  115<H>  |  25<H>  ----------------------------<  193<H>  4.8   |  29  |  0.78    Ca    8.7      15 Jul 2020 11:25  Phos  3.1     07-15  Mg     2.2     07-15    TPro  6.8  /  Alb  3.0<L>  /  TBili  0.4  /  DBili  x   /  AST  25  /  ALT  89<H>  /  AlkPhos  166<H>  07-15    Thyroid Stimulating Hormone, Serum: 2.85 uIU/mL (07-14-20 @ 22:34)                           Spoke with patient.  Educated on impact of current A1C on long and short term complications of Diabetes and discussed discrepancy between readings he states he's seeing and the 3 month BG average of 346.      Spoke w/ [facility] on [address] after speaking with patient.  Pharmacy states Novolin 70/30 ordered in 2018 (unsure if ever picked up due to age of prescription)  Basaglar pen prescription sent in September 2019 but never picked up.  Novolin N was sent in October 2019 but was never picked up.  Metformin was last picked up May 2020.       Recommendations:   Consult Endocrinologist Dr. Kraft .  Notified and will see pt tomorrow.  Transition off insulin drip when appropriate  Glargine dose TBD 2 hours prior to discontinuing drip  Moderate Lispro correction AC & HS  Hold off on pre-meal until insulin needs established and seen by Dr. Kraft     Will continue to monitor.  I will see patient on 7/17/2020     At discharge, recommend:  Metformn 1,000 mg BID.  Basal vs mixed insulin TBD      Pt can follow up at discharge with:  Dr. Kraft and Dr. Prince
[facility] Consultation    CHIEF COMPLAINT: Patient is a 64y old  Male who presents with a chief complaint of DKA and afib with RVR (15 Jul 2020 09:57)  asked by Dr. Stewart to see patient regarding rapid atrial fibrillation  the chart is reviewed and patient examined  history is from patient... fair reliability     HPI:  65 y/o male with pmhx of DM II (on insulin and metformin), HLD, HTN who presents to ED s/p syncopal event while grocery shopping. He hit his head on ground but did not lose consciousness. Upon arrival to ED patient is in afib with RVR to 180, hemodynamically stable and was also found to be in DKA with initial blood glucose of >800. He was given a total of 45 mg of cardizem IVP then started on cardizem gtt with limited improvement. At time of my arrival patients HR is 150 on 5 mg cardizem gtt. BP stable. increased to 15 mg per protocol.     Patient also received 10 units IVP insulin. Started on insulin gtt.    denies chest pain, shortness of breath, headache, fever, recent travel, cough, recent sick contact, palpitations, abdominal pain. States he has been compliant with meds except for tonight.    Also found to have tiny small SDH vs artifact on CT head. he is A&O x 4 and nonfocal. (14 Jul 2020 22:48)    As above... s/p syncope while at grocery store.  He recalls being on the ground and being brought to ER... but does not recall what happened prior to the event.  No chest pain or chest pressure.  No palpitations.  No shortness of breath.  No edema.  No orthopnea    PAST MEDICAL & SURGICAL HISTORY:  No known prior cardiac history... no CAD, MI, CHF or valvular heart disease   Shoulder pain, left  Dyslipidemia  Hypertension  Type 2 diabetes mellitus  S/P knee surgery: left, 30 years ago-left      SOCIAL HISTORY: non smoker, no alcohol, lives alone     FAMILY HISTORY  Family history of bone cancer  Family history of colon cancer (Sibling)    Home Medications:  aspirin 81 mg oral tablet: 1 tab(s) orally once a day (14 Jul 2020 20:54)  atorvastatin 10 mg oral tablet: 1 tab(s) orally once a day (at bedtime) (14 Jul 2020 20:54)  HumaLOG KwikPen 100 units/mL subcutaneous solution:  subcutaneous  (14 Jul 2020 20:54)  metFORMIN 1000 mg oral tablet: 1 tab(s) orally 2 times a day (14 Jul 2020 20:54)  Percocet 5/325 oral tablet: 1- 2 tab(s) orally every 4 hours (14 Jul 2020 20:54)  ramipril 2.5 mg oral tablet:  orally  (14 Jul 2020 20:54)    MEDICATIONS  (STANDING):  atorvastatin 10 milliGRAM(s) Oral at bedtime  chlorhexidine 2% Cloths 1 Application(s) Topical <User Schedule>  insulin regular Infusion 7 Unit(s)/Hr (7 mL/Hr) IV Continuous <Continuous>  lactated ringers 1000 milliLiter(s) (200 mL/Hr) IV Continuous <Continuous>  metoprolol tartrate 25 milliGRAM(s) Oral two times a day    MEDICATIONS  (PRN):      Allergies    No Known Allergies    Intolerances        REVIEW OF SYSTEMS:    CONSTITUTIONAL: weakness   EYES: No visual changes, No diplopia  ENMT: No throat pain , No exudate  NECK: No pain or stiffness  RESPIRATORY: No cough, wheezing, hemoptysis; No shortness of breath  CARDIOVASCULAR: No chest pain or chest pressure.  No shortness of breath or dyspnea on exertion.  No palpitations.   No edema, no orthopnea.   GASTROINTESTINAL: No abdominal pain. No nausea, vomiting, or hematemesis; No diarrhea or constipation. No melena or hematochezia.  GENITOURINARY: No dysuria, frequency or hematuria  NEUROLOGICAL: No numbness or weakness  SKIN: No itching or rash  All other review of systems is negative unless indicated above    VITAL SIGNS:   Vital Signs Last 24 Hrs  T(C): 36.6 (15 Jul 2020 06:00), Max: 36.7 (14 Jul 2020 21:10)  T(F): 97.8 (15 Jul 2020 06:00), Max: 98 (14 Jul 2020 21:10)  HR: 77 (15 Jul 2020 08:00) (77 - 167)  BP: 132/61 (15 Jul 2020 08:00) (105/71 - 163/89)  BP(mean): 79 (15 Jul 2020 08:00) (79 - 93)  RR: 18 (15 Jul 2020 08:00) (17 - 22)  SpO2: 99% (15 Jul 2020 08:00) (91% - 99%)    I&O's Summary    14 Jul 2020 07:01  -  15 Jul 2020 07:00  --------------------------------------------------------  IN: 1352.3 mL / OUT: 601 mL / NET: 751.3 mL    15 Jul 2020 07:01  -  15 Jul 2020 10:44  --------------------------------------------------------  IN: 268.1 mL / OUT: 0 mL / NET: 268.1 mL        PHYSICAL EXAM:    Constitutional: older appearing man in NAD   Eyes:  EOMI,  Pupils round, no lesions  ENMT: no exudate or erythema  Pulmonary: decreased breath sounds bilaterally   Cardiovascular: PMI not palpable non-displaced Regular S1 and S2 l/Vl systolic murmur   Gastrointestinal: Bowel Sounds present, soft, nontender.   Lymph: No peripheral edema. No cervical lymphadenopathy.  Neurological: Alert, no focal deficits  Skin: No rashes. Changes of chronic venous stasis. No cyanosis.  Psych:  Mood & affect appropriate    LABS: All Labs Reviewed:                        15.2   13.80 )-----------( 214      ( 15 Jul 2020 05:30 )             44.9                         17.4   9.93  )-----------( 244      ( 14 Jul 2020 20:20 )             49.5     15 Jul 2020 05:30    150    |  116    |  29     ----------------------------<  283    4.0     |  28     |  0.96   14 Jul 2020 20:20    141    |  103    |  41     ----------------------------<  816    4.2     |  18     |  1.46     Ca    9.0        15 Jul 2020 05:30  Ca    9.9        14 Jul 2020 20:20  Phos  2.6       15 Jul 2020 05:30  Phos  5.2       14 Jul 2020 20:20  Mg     2.3       15 Jul 2020 05:30  Mg     2.4       14 Jul 2020 20:20    TPro  6.8    /  Alb  3.0    /  TBili  0.4    /  DBili  x      /  AST  25     /  ALT  89     /  AlkPhos  166    15 Jul 2020 05:30  TPro  7.5    /  Alb  3.7    /  TBili  0.5    /  DBili  x      /  AST  38     /  ALT  113    /  AlkPhos  274    14 Jul 2020 20:20    PT/INR - ( 14 Jul 2020 23:00 )   PT: 11.7 sec;   INR: 0.97 ratio         PTT - ( 14 Jul 2020 23:00 )  PTT:29.6 sec  CARDIAC MARKERS ( 15 Jul 2020 05:30 )  <.017 ng/mL / x     / x     / x     / x      CARDIAC MARKERS ( 14 Jul 2020 20:20 )  <.017 ng/mL / x     / x     / x     / x              07-14 @ 22:34  TSH: 2.85      RADIOLOGY:  < from: Xray Chest 1 View AP/PA (07.14.20 @ 21:39) >  No radiographic evidence of acute cardiothoracic disease.     < end of copied text >    < from: CT Head No Cont (07.14.20 @ 21:51) >   Small right parietal scalp hematoma.    Thin sliver of extra-axial hyperdensity in the left frontal region-small subdural hematoma versus partial volume artifact.    < end of copied text >    EKG:  < from: 12 Lead ECG (07.14.20 @ 20:55) >  Atrial fibrillation with rapid ventricular response  Minimal voltage criteria for LVH, may be normal variant  Marked ST abnormality, possible lateral subendocardial injury  Abnormal ECG  When compared with ECG of 14-JUL-2020 20:15,  Atrial fibrillation has replaced Sinus rhythm  ST now depressed in Anterior leads    < end of copied text >    < from: TTE Echo Complete w/o Contrast w/ Doppler (07.15.20 @ 08:05) >   1. Left ventricular ejection fraction, by visual estimation, is 50 to 55%.   2. Normal global left ventricular systolic function.   3. Normal left ventricular internal cavity size.   4. Spectral Doppler shows impaired relaxation pattern of left ventricular myocardial filling (Grade I diastolic dysfunction).   5. There is no evidence of pericardial effusion.   6. Mild mitral valve regurgitation.   7. Thickening of the anterior and posterior mitral valve leaflets.   8. Sclerotic aortic valve with normal opening.   9. LA volume Index is 19.9 ml/m² ml/m2.    < end of copied text >

## 2020-07-17 NOTE — PROGRESS NOTE ADULT - ASSESSMENT
Physical Examination:  GENERAL:               Alert, Oriented, No acute distress.    PULM:                     Bilateral air entry, Clear to auscultation bilaterally, no significant sputum production, No Rales, No Rhonchi, No Wheezing  CVS:                         S1, S2,  No Murmur  ABD:                        Soft, nondistended, nontender, normoactive bowel sounds,   NEURO:                  Alert, oriented, interactive, nonfocal, follows commands  PSYC:                      Calm, + Insight.        Assessment  Syncope Likely due to Afib with RVR and uncontrolled DM2 with hyperglycemia / HHS  Hypernatremia/Dehydration Resolving  Possible SDH on CT - artifact  Bacteremia - Coag Neg Staph - likely Contamination  underlying DM2, HTN,       Plan  Rate control as per cardio  consider to start a/c with eliquis  off abx  Endo evaluated patient and changed insulin regimen.  DM education    Case d/w Dr. Palomino this AM  Disposition: continue care on tele. no active ccm issues reconsult as needed     Goals of Care: Full code

## 2020-07-17 NOTE — PROGRESS NOTE ADULT - ASSESSMENT
65 yo male with DM II, HTN, and HLD admitted 7/14 with a syncopal event. Found to be in A fib with RVR and DKA.      Afib with RVR - new onset  -Possible SDH on initial CT but repeat CT head with no SDH  Neuro consult-Dr Eddy--ok to start AC--started eliquis  Rate control with Lopressor   Cardio following  Monitor on tele w no acute events     DKA - now resolved   Uncontrolled DM2 - A1C 13.6   - s/p ICU   new accucheck given to pt   diabetic education with follow up with Lana JAMES NP  Lantus increased 32 iu  accucheck and ISS  Endocrinology following--discharged on NPH BID    Hypernatremia/Dehydration- resolved   IV hydration     Bacteremia- coag neg staph  3/4 blood cx with coag negative staph--probable contaminant  ID following  Repeat blood cx 7/16 pending  - follow up repeat cultures   - follow up ID   -off abx     HTN  currently stable   Continue metoprolol, cardizam     Case d.w Dr Eddy (neuro)                          Goals of Care: Full code  Dispo: home within 24 hours after blood cx results-PT eval home with rolling walker vs rehab (patient does not rehab) 65 yo male with DM II, HTN, and HLD admitted 7/14 with a syncopal event. Found to be in A fib with RVR and DKA.      Afib with RVR - new onset  -Possible SDH on initial CT but repeat CT head with no SDH  Neuro consult-Dr Eddy--ok to start AC--started eliquis  Rate control with Lopressor   Cardio following  Monitor on tele w no acute events     DKA - now resolved   Uncontrolled DM2 - A1C 13.6   - s/p ICU   new accucheck given to pt   diabetic education with follow up with Lana JAMES NP  Lantus increased 32 iu  accucheck and ISS  Endocrinology following--discharged on NPH BID    Hypernatremia/Dehydration- resolved   IV hydration     Bacteremia- coag neg staph  3/4 blood cx with staph hominis--probable contaminant  ID following  Repeat blood cx 7/16 pending  - follow up repeat cultures   - follow up ID   -off abx     HTN  currently stable   Continue metoprolol, cardizam     Case d.w Dr Eddy (neuro)                          Goals of Care: Full code  Dispo: home within 24 hours after blood cx results-PT eval home with rolling walker vs rehab (patient does not rehab)

## 2020-07-17 NOTE — PROGRESS NOTE ADULT - ASSESSMENT
paf in setting of dka/loc  + bc?contaminant as per ID  receiving oral a/c  in sinus rhythm  echo normal lv    cardiac opd f/u

## 2020-07-17 NOTE — PROGRESS NOTE ADULT - SUBJECTIVE AND OBJECTIVE BOX
Follow-up Pulmonary Progress Note  Chief Complaint : Hyperglycemia      pt feeling well  no complaints  no palp, n/v      Allergies :No Known Allergies      PAST MEDICAL & SURGICAL HISTORY:  Shoulder pain, left  Dyslipidemia  Hypertension  Type 2 diabetes mellitus  S/P knee surgery: left, 30 years ago-left      Medications:  MEDICATIONS  (STANDING):  apixaban 5 milliGRAM(s) Oral two times a day  atorvastatin 10 milliGRAM(s) Oral at bedtime  chlorhexidine 2% Cloths 1 Application(s) Topical <User Schedule>  dextrose 5%. 1000 milliLiter(s) (50 mL/Hr) IV Continuous <Continuous>  dextrose 50% Injectable 12.5 Gram(s) IV Push once  dextrose 50% Injectable 25 Gram(s) IV Push once  dextrose 50% Injectable 25 Gram(s) IV Push once  insulin glargine Injectable (LANTUS) 35 Unit(s) SubCutaneous at bedtime  insulin lispro (HumaLOG) corrective regimen sliding scale   SubCutaneous three times a day before meals  insulin lispro Injectable (HumaLOG) 6 Unit(s) SubCutaneous before breakfast  insulin lispro Injectable (HumaLOG) 6 Unit(s) SubCutaneous before lunch  insulin lispro Injectable (HumaLOG) 6 Unit(s) SubCutaneous before dinner  metoprolol tartrate 25 milliGRAM(s) Oral two times a day    MEDICATIONS  (PRN):  dextrose 40% Gel 15 Gram(s) Oral once PRN Blood Glucose LESS THAN 70 milliGRAM(s)/deciliter  glucagon  Injectable 1 milliGRAM(s) IntraMuscular once PRN Glucose LESS THAN 70 milligrams/deciliter      LABS:                        14.8   8.79  )-----------( 168      ( 17 Jul 2020 05:30 )             43.2     07-17    138  |  104  |  14  ----------------------------<  183<H>  3.8   |  23  |  0.66    Ca    8.3<L>      17 Jul 2020 05:30      Glucose Trend  07-17-20 @ 13:13   -  -- -- 221<H>  07-17-20 @ 08:39   -  -- -- 176<H>  07-17-20 @ 05:30   -  -- 183<H> --  07-16-20 @ 21:09   -  -- -- 372<H>  07-16-20 @ 17:10   -  -- -- 247<H>  07-16-20 @ 12:40   -  -- -- 284<H>  07-16-20 @ 08:10   -  -- 276<H> --  07-16-20 @ 07:53   -  -- -- 263<H>  07-15-20 @ 21:50   -  -- -- 286<H>  07-15-20 @ 17:47   -  -- -- 221<H>                        CULTURES: (if applicable)    Culture - Blood (collected 07-16-20 @ 10:29)  Source: .Blood Blood-Peripheral  Preliminary Report (07-17-20 @ 13:02):    No growth to date.    Culture - Blood (collected 07-16-20 @ 10:28)  Source: .Blood Blood-Peripheral  Preliminary Report (07-17-20 @ 13:02):    No growth to date.    Culture - Urine (collected 07-14-20 @ 20:20)  Source: .Urine Clean Catch (Midstream)  Final Report (07-16-20 @ 02:52):    <10,000 CFU/mL Normal Urogenital Dolores    Culture - Blood (collected 07-14-20 @ 20:20)  Source: .Blood Blood-Peripheral  Gram Stain (07-15-20 @ 23:07):    Growth in aerobic bottle: Gram Positive Cocci in Clusters  Preliminary Report (07-16-20 @ 20:31):    Growth in aerobic bottle: Staphylococcus hominis    Culture - Blood (collected 07-14-20 @ 20:20)  Source: .Blood Blood-Peripheral  Gram Stain (07-15-20 @ 18:41):    Growth in anaerobic bottle: Gram Positive Cocci in Clusters    Growth in aerobic bottle: Gram Positive Cocci in Clusters  Final Report (07-16-20 @ 19:35):    Growth in aerobic and anaerobic bottles: Staphylococcus hominis    "Due to technical problems, Proteus sp. will Not be reported as part of    the BCID panel until further notice"    ***Blood Panel PCR results on this specimen are available    approximately3 hours after the Gram stain result.***    Gram stain, PCR, and/or culture results may not always    correspond due to difference in methodologies.    ************************************************************    This PCR assay was performed using [employer].    The following targets are tested for: Enterococcus,    vancomycin resistant enterococci, Listeria monocytogenes,    coagulase negative staphylococci, S. aureus,    methicillin resistant S. aureus, Streptococcus agalactiae    (Group B), S. pneumoniae, S. pyogenes (Group A),    Acinetobacter baumannii, Enterobacter cloacae, E. coli,    Klebsiella oxytoca, K. pneumoniae, Proteus sp.,    Serratia marcescens, Haemophilus influenzae,    Neisseria meningitidis, Pseudomonas aeruginosa, Candida    albicans, C. glabrata, C krusei, C parapsilosis,    C. tropicalis and the KPC resistance gene.  Organism: Blood Culture PCR (07-16-20 @ 19:35)  Organism: Blood Culture PCR (07-16-20 @ 19:35)      -  Coagulase negative Staphylococcus: Detec      Method Type: PCR      VITALS:  T(C): 36.9 (07-17-20 @ 11:11), Max: 37.2 (07-16-20 @ 20:26)  T(F): 98.4 (07-17-20 @ 11:11), Max: 98.9 (07-16-20 @ 20:26)  HR: 75 (07-17-20 @ 11:11) (74 - 91)  BP: 138/75 (07-17-20 @ 11:11) (136/87 - 185/89)  BP(mean): --  ABP: --  ABP(mean): --  RR: 16 (07-17-20 @ 11:11) (16 - 16)  SpO2: 94% (07-17-20 @ 11:11) (93% - 95%)  CVP(mm Hg): --  CVP(cm H2O): --    Ins and Outs     07-16-20 @ 07:01  -  07-17-20 @ 07:00  --------------------------------------------------------  IN: 750 mL / OUT: 2300 mL / NET: -1550 mL    07-17-20 @ 07:01  -  07-17-20 @ 13:38  --------------------------------------------------------  IN: 400 mL / OUT: 400 mL / NET: 0 mL        Height (cm): 193 (07-15-20 @ 01:55)  Weight (kg): 112 (07-15-20 @ 01:55)  BMI (kg/m2): 30.1 (07-15-20 @ 01:55)        I&O's Detail    16 Jul 2020 07:01  -  17 Jul 2020 07:00  --------------------------------------------------------  IN:    Oral Fluid: 750 mL  Total IN: 750 mL    OUT:    Voided: 2300 mL  Total OUT: 2300 mL    Total NET: -1550 mL      17 Jul 2020 07:01  -  17 Jul 2020 13:38  --------------------------------------------------------  IN:    Oral Fluid: 400 mL  Total IN: 400 mL    OUT:    Voided: 400 mL  Total OUT: 400 mL    Total NET: 0 mL

## 2020-07-17 NOTE — DISCHARGE NOTE PROVIDER - CARE PROVIDER_API CALL
[facility]  [address] [place] [address]  Phone: [phone]  Fax: [phone]  Follow Up Time:

## 2020-07-17 NOTE — PROGRESS NOTE ADULT - ATTENDING COMMENTS
I have personally seen and examined patient on the above date.  I discussed the case with MITCHELL Parks and I agree with findings and plan as detailed per note above, which I have amended where appropriate.      Awaiting repeat blood cultures  - continue antibiotics for now  - suspect contaminant  - possible discharge to home over weekend

## 2020-07-17 NOTE — DISCHARGE NOTE PROVIDER - NSDCMRMEDTOKEN_GEN_ALL_CORE_FT
aspirin 81 mg oral tablet: 1 tab(s) orally once a day  atorvastatin 10 mg oral tablet: 1 tab(s) orally once a day (at bedtime)  metFORMIN 1000 mg oral tablet: 1 tab(s) orally 2 times a day  Percocet 5/325 oral tablet: 1- 2 tab(s) orally every 4 hours  ramipril 2.5 mg oral tablet:  orally alcohol swabs : Apply topically to affected area 4 times a day   apixaban 5 mg oral tablet: 1 tab(s) orally 2 times a day   atorvastatin 10 mg oral tablet: 1 tab(s) orally once a day (at bedtime)  glucometer (per patient&#x27;s insurance): Test blood sugars four times a day. Dispense #1 glucometer. for DM II  glucometer (per patient&#x27;s insurance): Test blood sugars four times a day. Dispense #1 glucometer.  HumuLIN N 100 units/mL subcutaneous suspension: 30 unit(s) subcutaneous 2 times a day at breakfast and bedtime for DM II  Insulin Pen Needles, 4mm: 1 application subcutaneously 4 times a day. ** Use with insulin pen **  for DM II  metFORMIN 1000 mg oral tablet: 1 tab(s) orally 2 times a day for DM II  metoprolol tartrate 25 mg oral tablet: 1 tab(s) orally 2 times a day alcohol swabs : Apply topically to affected area 4 times a day   apixaban 5 mg oral tablet: 1 tab(s) orally 2 times a day   atorvastatin 10 mg oral tablet: 1 tab(s) orally once a day (at bedtime)  glucometer (per patient&#x27;s insurance): Test blood sugars four times a day. Dispense #1 glucometer. for DM II  glucometer (per patient&#x27;s insurance): Test blood sugars four times a day. Dispense #1 glucometer.  HumuLIN N 100 units/mL subcutaneous suspension: 30 unit(s) subcutaneous 2 times a day at breakfast and bedtime for DM II    Dx E 11.9  Insulin Pen Needles, 4mm: 1 application subcutaneously 4 times a day. ** Use with insulin pen **  for DM II  metFORMIN 1000 mg oral tablet: 1 tab(s) orally 2 times a day for DM II  E11.9  metoprolol tartrate 25 mg oral tablet: 1 tab(s) orally 2 times a day

## 2020-07-17 NOTE — PROGRESS NOTE ADULT - SUBJECTIVE AND OBJECTIVE BOX
Diabetes Note:    Reports feeling well this morning.  Did not eat breakfast.  States he doesn't really eat "meals" but grazes all day.  Reports eating cakes and cookies sometimes in large volume.   Reports missing 3-4 evening doses of Metformin and insulin.  States it is not because he falls asleep but because he forgets.  States he is unable to read because he has severe dyslexia but is able to recognize numbers.  States sister pays all bills for him but does not live with him.  Not currently working and states he "has no money."  Was previously working as a   Sees Dr. Tamez for nail care and Dr. Stapleton for optho.  States last appts for both were about 6 months ago    Current Endocrine Meds:   atorvastatin 10 milliGRAM(s) Oral at bedtime  dextrose 40% Gel 15 Gram(s) Oral once PRN  dextrose 50% Injectable 12.5 Gram(s) IV Push once  dextrose 50% Injectable 25 Gram(s) IV Push once  dextrose 50% Injectable 25 Gram(s) IV Push once  glucagon  Injectable 1 milliGRAM(s) IntraMuscular once PRN  insulin glargine Injectable (LANTUS) 35 Unit(s) SubCutaneous at bedtime  insulin lispro (HumaLOG) corrective regimen sliding scale   SubCutaneous three times a day before meals  insulin lispro Injectable (HumaLOG) 6 Unit(s) SubCutaneous before breakfast  insulin lispro Injectable (HumaLOG) 6 Unit(s) SubCutaneous before lunch  insulin lispro Injectable (HumaLOG) 6 Unit(s) SubCutaneous before dinner      Vital Signs Last 24 Hrs  T(C): 36.9 (17 Jul 2020 11:11), Max: 37.2 (16 Jul 2020 20:26)  T(F): 98.4 (17 Jul 2020 11:11), Max: 98.9 (16 Jul 2020 20:26)  HR: 75 (17 Jul 2020 11:11) (74 - 91)  BP: 138/75 (17 Jul 2020 11:11) (136/87 - 185/89)  BP(mean): --  RR: 16 (17 Jul 2020 11:11) (16 - 16)  SpO2: 94% (17 Jul 2020 11:11) (93% - 95%)    Psychiatric: A&O x 3, normal affect/mood.  Pleasant and cooperative  Feet:  skin intact.  onychomycosis on B/L great toes.  nails over grown.  Decreased sensation in B/L feet    Hypoglycemic Episodes: none    Met with patient,  Pt was unable to see numbers on insulin pen due to recent blurred vision but was able to count and feel the clicks.  Was able to correctly dial up 30 units w/ no cueing.  States he does not wear glasses.  Lengthy discussion on importance of taking medication as prescribed and the need to set alarms to take bedtime insulin and metformin.  Encouraged to test BG at those times as well.  Encouraged regular follow up with Dr. Kraft.  Spoke with sister Jimbo via telephone.  Sister confirms that pt has a Medicare part D drug plan for prescriptions.    Diabetes Plan  For now:  Increase Glargine from 30 to 35 units at bedtime  Continue Lispro 6 units three times a day before meals w/ moderate correction AC & HS    Will continue to monitor     At discharge, recommend:  As per Dr. Kraft send home on Humulin N 30 units BID morning and bedtime as well as Metformin 1,000 mg BID      Pt can follow up at discharge with:  Dr. Kraft

## 2020-07-17 NOTE — CONSULT NOTE ADULT - CONSULT REASON
Uncontrolled T2 DM
Uncontrolled Type 2 diabetes w/ DKA.  A1C 13.7%
bacteremia
syncope with head trauma
atrial fibrillation

## 2020-07-17 NOTE — PROGRESS NOTE ADULT - SUBJECTIVE AND OBJECTIVE BOX
Patient is a 64y old  Male who presents with a chief complaint of DKA and afib with RVR (2020 10:55). No acute issues overnight.      Patient seen and examined at bedside.    ALLERGIES:  No Known Allergies    MEDICATIONS  (STANDING):  apixaban 5 milliGRAM(s) Oral two times a day  atorvastatin 10 milliGRAM(s) Oral at bedtime  chlorhexidine 2% Cloths 1 Application(s) Topical <User Schedule>  dextrose 5%. 1000 milliLiter(s) (50 mL/Hr) IV Continuous <Continuous>  dextrose 50% Injectable 12.5 Gram(s) IV Push once  dextrose 50% Injectable 25 Gram(s) IV Push once  dextrose 50% Injectable 25 Gram(s) IV Push once  insulin glargine Injectable (LANTUS) 32 Unit(s) SubCutaneous at bedtime  insulin lispro (HumaLOG) corrective regimen sliding scale   SubCutaneous three times a day before meals  insulin lispro Injectable (HumaLOG) 6 Unit(s) SubCutaneous before breakfast  insulin lispro Injectable (HumaLOG) 6 Unit(s) SubCutaneous before lunch  insulin lispro Injectable (HumaLOG) 6 Unit(s) SubCutaneous before dinner  metoprolol tartrate 25 milliGRAM(s) Oral two times a day    MEDICATIONS  (PRN):  dextrose 40% Gel 15 Gram(s) Oral once PRN Blood Glucose LESS THAN 70 milliGRAM(s)/deciliter  glucagon  Injectable 1 milliGRAM(s) IntraMuscular once PRN Glucose LESS THAN 70 milligrams/deciliter    Vital Signs Last 24 Hrs  T(F): 98.4 (2020 11:11), Max: 98.9 (2020 20:26)  HR: 75 (2020 11:11) (74 - 91)  BP: 138/75 (2020 11:11) (136/87 - 185/89)  RR: 16 (2020 11:11) (16 - 16)  SpO2: 94% (2020 11:11) (93% - 95%)  I&O's Summary    2020 07:01  -  2020 07:00  --------------------------------------------------------  IN: 750 mL / OUT: 2300 mL / NET: -1550 mL      BMI (kg/m2): 30.1 (07-15-20 @ 01:55)  PHYSICAL EXAM:  General: NAD, A/O x 2  ENT: MMM  Neck: Supple, No JVD  Lungs: Clear to auscultation bilaterally  Cardio: RRR, S1/S2, No pitting edema bilaterally  Abdomen: Soft,obese  Nontender, Nondistended; Bowel sounds present  Extremities: No calf tenderness , moves all extremities    LABS:                        14.8   8.79  )-----------( 168      ( 2020 05:30 )             43.2           138  |  104  |  14  ----------------------------<  183  3.8   |  23  |  0.66    Ca    8.3      2020 05:30  Phos  3.1     -15  Mg     2.2     07-15    TPro  6.8  /  Alb  3.0  /  TBili  0.4  /  DBili  x   /  AST  25  /  ALT  89  /  AlkPhos  166  -15     eGFR if Non African American: 103 mL/min/1.73M2 (20 @ 05:30)  eGFR if African American: 120 mL/min/1.73M2 (20 @ 05:30)    PT/INR - ( 2020 23:00 )   PT: 11.7 sec;   INR: 0.97 ratio         PTT - ( 2020 23:00 )  PTT:29.6 sec   Lactate, Blood: 1.8 mmol/L (07-15 @ 05:30)  Lactate, Blood: 1.5 mmol/L ( @ 23:00)  Lactate, Blood: 2.5 mmol/L ( @ 20:20)    CARDIAC MARKERS ( 15 Jul 2020 05:30 )  <.017 ng/mL / x     / x     / x     / x      CARDIAC MARKERS ( 2020 20:20 )  <.017 ng/mL / x     / x     / x     / x          07-15 Chol 195 mg/dL  mg/dL HDL 50 mg/dL Trig 170 mg/dL  TSH 2.85   TSH with FT4 reflex --  Total T3 --    20:20 - VBG - pH: 7.35  | pCO2: 35    | pO2: 49    | Lactate:                  POCT Blood Glucose.: 176 mg/dL (2020 08:39)  POCT Blood Glucose.: 372 mg/dL (2020 21:09)  POCT Blood Glucose.: 247 mg/dL (2020 17:10)  POCT Blood Glucose.: 284 mg/dL (2020 12:40)      Urinalysis Basic - ( 2020 20:20 )    Color: Yellow / Appearance: Clear / S.010 / pH: x  Gluc: x / Ketone: Trace  / Bili: Negative / Urobili: Negative   Blood: x / Protein: 30 mg/dL / Nitrite: Negative   Leuk Esterase: Negative / RBC: 0-4 /HPF / WBC Negative /HPF   Sq Epi: x / Non Sq Epi: Neg.-Few / Bacteria: Few /HPF        Culture - Urine (collected 2020 20:20)  Source: .Urine Clean Catch (Midstream)  Final Report (2020 02:52):    <10,000 CFU/mL Normal Urogenital Dolores    Culture - Blood (collected 2020 20:20)  Source: .Blood Blood-Peripheral  Gram Stain (15 Jul 2020 23:07):    Growth in aerobic bottle: Gram Positive Cocci in Clusters  Preliminary Report (2020 20:31):    Growth in aerobic bottle: Staphylococcus hominis    Culture - Blood (collected 2020 20:20)  Source: .Blood Blood-Peripheral  Gram Stain (15 Jul 2020 18:41):    Growth in anaerobic bottle: Gram Positive Cocci in Clusters    Growth in aerobic bottle: Gram Positive Cocci in Clusters  Final Report (2020 19:35):    Growth in aerobic and anaerobic bottles: Staphylococcus hominis    "Due to technical problems, Proteus sp. will Not be reported as part of    the BCID panel until further notice"    ***Blood Panel PCR results on this specimen are available    approximately3 hours after the Gram stain result.***    Gram stain, PCR, and/or culture results may not always    correspond due to difference in methodologies.    ************************************************************    This PCR assay was performed using [employer].    The following targets are tested for: Enterococcus,    vancomycin resistant enterococci, Listeria monocytogenes,    coagulase negative staphylococci, S. aureus,    methicillin resistant S. aureus, Streptococcus agalactiae    (Group B), S. pneumoniae, S. pyogenes (Group A),    Acinetobacter baumannii, Enterobacter cloacae, E. coli,    Klebsiella oxytoca, K. pneumoniae, Proteus sp.,    Serratia marcescens, Haemophilus influenzae,    Neisseria meningitidis, Pseudomonas aeruginosa, Candida    albicans, C. glabrata, C krusei, C parapsilosis,    C. tropicalis and the KPC resistance gene.  Organism: Blood Culture PCR (2020 19:35)  Organism: Blood Culture PCR (2020 19:35)      -  Coagulase negative Staphylococcus: Detec      Method Type: PCR        RADIOLOGY & ADDITIONAL TESTS:    Care Discussed with Consultants/Other Providers:

## 2020-07-17 NOTE — PROGRESS NOTE ADULT - SUBJECTIVE AND OBJECTIVE BOX
Follow up for syncope, dka, paf, sdh    SUBJ: no current complain    PMH  Shoulder pain, left  Obesity  Dyslipidemia  Hypertension  Type 2 diabetes mellitus      MEDICATIONS  (STANDING):  apixaban 5 milliGRAM(s) Oral two times a day  atorvastatin 10 milliGRAM(s) Oral at bedtime  chlorhexidine 2% Cloths 1 Application(s) Topical <User Schedule>  dextrose 5%. 1000 milliLiter(s) (50 mL/Hr) IV Continuous <Continuous>  dextrose 50% Injectable 12.5 Gram(s) IV Push once  dextrose 50% Injectable 25 Gram(s) IV Push once  dextrose 50% Injectable 25 Gram(s) IV Push once  insulin glargine Injectable (LANTUS) 32 Unit(s) SubCutaneous at bedtime  insulin lispro (HumaLOG) corrective regimen sliding scale   SubCutaneous three times a day before meals  insulin lispro Injectable (HumaLOG) 6 Unit(s) SubCutaneous before breakfast  insulin lispro Injectable (HumaLOG) 6 Unit(s) SubCutaneous before lunch  insulin lispro Injectable (HumaLOG) 6 Unit(s) SubCutaneous before dinner  metoprolol tartrate 25 milliGRAM(s) Oral two times a day    MEDICATIONS  (PRN):  dextrose 40% Gel 15 Gram(s) Oral once PRN Blood Glucose LESS THAN 70 milliGRAM(s)/deciliter  glucagon  Injectable 1 milliGRAM(s) IntraMuscular once PRN Glucose LESS THAN 70 milligrams/deciliter        PHYSICAL EXAM:  Vital Signs Last 24 Hrs  T(C): 36.9 (2020 05:50), Max: 37.2 (2020 20:26)  T(F): 98.4 (2020 05:50), Max: 98.9 (2020 20:26)  HR: 80 (2020 05:50) (74 - 91)  BP: 185/89 (2020 05:50) (136/87 - 185/89)  BP(mean): --  RR: 16 (2020 05:50) (16 - 16)  SpO2: 95% (2020 05:50) (93% - 95%)    GENERAL: NAD, well-groomed, well-developed  HEAD:  Atraumatic, Normocephalic  EYES:  conjunctiva and sclera clear  ENT: Moist mucous membranes,  NECK: Supple, No JVD, no bruits  CHEST/LUNG: Clear to auscultation bilaterally; No rales, rhonchi, wheezing, or rubs  HEART: Regular rate and rhythm; No murmurs, rubs, or gallops PMI non displaced.  ABDOMEN: Soft, Nontender, Nondistended; Bowel sounds present  EXTREMITIES:  No clubbing, cyanosis, or edema  SKIN: No rashes or lesions  NERVOUS SYSTEM:  Alert       TELEMETRY:  sinus    ECG:    < from: 12 Lead ECG (20 @ 20:55) >    Ventricular Rate 179 BPM    Atrial Rate 192 BPM    QRS Duration 78 ms    Q-T Interval 258 ms    QTC Calculation(Bezet) 445 ms    R Axis 12 degrees    T Axis -54 degrees    Diagnosis Line Atrial fibrillation with rapid ventricular response  Minimal voltage criteria for LVH, may be normal variant  Marked ST abnormality, possible lateral subendocardial injury  Abnormal ECG  When compared with ECG of 2020 20:15,  Atrial fibrillation has replaced Sinus rhythm  ST now depressed in Anterior leads  Confirmed by GILDA QURESHI, KELYB TONG () on 7/15/2020 8:43:18 AM    < end of copied text >      LABS:                        14.8   8.79  )-----------( 168      ( 2020 05:30 )             43.2     07-17    138  |  104  |  14  ----------------------------<  183<H>  3.8   |  23  |  0.66    Ca    8.3<L>      2020 05:30  Phos  3.1     07-15  Mg     2.2     07-15              I&O's Summary    2020 07:01  -  2020 07:00  --------------------------------------------------------  IN: 750 mL / OUT: 2300 mL / NET: -1550 mL          RADIOLOGY & ADDITIONAL STUDIES:    < from: CT Head No Cont (07.15.20 @ 10:35) >    EXAM:  CT BRAIN      PROCEDURE DATE:  07/15/2020        INTERPRETATION:  CLINICAL INFORMATION: f/u ? SDH. . ADMDIAG1: R73.9 HYPERGLYCEMIA, UNSPECIFIED/.    TECHNIQUE: Sequential axial images were obtained from the vertex to the skull base without intravenous contrast. Coronal and sagittal reformations were obtained.     COMPARISON: Prior CT dated 2020.  MRI brain 10/1/2009.    FINDINGS:    A left frontal extra-axial density is unchanged. This has a linear appearance, suggesting a vessel (2:21, 602:36). No evidence of subdural hematoma.    There is no acute intracranial hemorrhage or mass effect. There are areas of hypodensity in the bilateral hemispheric white matter suggesting chronic white matter microvascular ischemic change. The ventricles and sulci are normal in size for patient's age.     There is no displaced calvarial fracture. Right parietal scalp soft tissue swelling. The visualized orbits are within normal limits. The visualized portions of the paranasal sinuses are wellaerated. The mastoid air cells are well aerated.      IMPRESSION:   1.  A left frontal extra-axial density is unchanged. This has a linear appearance, suggesting a vessel. No evidence of subdural hematoma.  2.  Right parietal scalp soft tissue swelling.    < end of copied text >      ECHO:    < from: TTE Echo Complete w/o Contrast w/ Doppler (07.15.20 @ 08:05) >  EXAM:  ECHO TTE WO CON COMP W DOPP      PROCEDURE DATE:  07/15/2020        INTERPRETATION:  REPORT:    TRANSTHORACIC ECHOCARDIOGRAM REPORT         Patient Name:   NICOLE PAVON Patient Location: [address] Rec #:  [id number]   Accession #:      [id number]  Account #:                              Height:           76.0 in 193.0 cm  YOB: 1955              Weight:           250.0 lb 113.40 kg  Patient Age:    64 years                BSA:              2.44 m²  Patient Gender: M                       BP:               132/62 mmHg       Date of Exam:        7/15/2020 8:05:28 AM  Sonographer:         [place]  Referring Physician: SHARRI    Procedure:     2D Echo/Doppler/Color Doppler Complete.  Indications:   Unspecified atrial fibrillation - I48.91  Diagnosis:     Unspecified atrial flutter - I48.92  Study Details: Technically fair study.         2D AND M-MODE MEASUREMENTS (normal ranges within parentheses):  Left Ventricle:                  Normal   Aorta/Left Atrium:             Normal  IVSd (2D):              1.44 cm (0.7-1.1) Aortic Root (Mmode): 3.70 cm (2.4-3.7)  LVPWd (2D):             1.12 cm (0.7-1.1) AoV Cusp Separation: 2.11 cm (1.5-2.6)  LVIDd (2D):             4.52 cm (3.4-5.7) Left Atrium (Mmode): 4.60 cm (1.9-4.0)  LVIDs (2D):             3.20 cm  LV FS (2D):             29.3 %   (>25%)  LV EF (2D):              56 %    (>55%)  Relative Wall Thickness  0.49    (<0.42)    LV DIASTOLIC FUNCTION:  MV Peak E: 0.76 m/s Decel Time: 209 msec  MV Peak A: 0.83 m/s  E/A Ratio: 0.92    SPECTRAL DOPPLER ANALYSIS (where applicable):  Mitral Valve:  MV P1/2 Time: 60.49 msec  MV Area, PHT: 3.64 cm²    Aortic Valve: AoV Max Ron: 1.16 m/s AoV Peak P.4 mmHg AoV Mean P.6 mmHg    LVOT Vmax: 0.96 m/s LVOT VTI: 0.171 m LVOT Diameter: 1.90 cm    AoV Area, Vmax: 2.34 cm² AoV Area, VTI: 2.76 cm² AoV Area, Vmn: 2.12 cm²  Ao VTI: 0.176  Tricuspid Valve and PA/RV Systolic Pressure: TR Max Velocity: 1.00 m/s RA Pressure: 10 mmHg RVSP/PASP: 14.0 mmHg       PHYSICIAN INTERPRETATION:  Left Ventricle: The left ventricular internal cavity size is normal.  Global LV systolic function was normal. Left ventricular ejection fraction, by visual estimation, is 50 to 55%. Spectral Doppler shows impaired relaxation pattern of left ventricular myocardial filling (Grade I diastolic dysfunction). Not all segments of the left ventricular endocardium are well visualized. The overall LV funciton, appears to be grossly normal. A discrete regional wall motion abnormality, however, cannot be completely ruled out.  Right Ventricle: Normal right ventricular size and function. The right ventricular size is mildly enlarged.  Left Atrium: Mildly enlarged left atrium. LA volume Index is 19.9 ml/m² ml/m2.  Right Atrium: Normal right atrial size.  Pericardium: There is no evidence of pericardial effusion.  Mitral Valve: Thickening of the anterior and posterior mitral valve leaflets. Mild mitral valve regurgitation is seen.  Tricuspid Valve: The tricuspid valve isnormal in structure. Mild tricuspid regurgitation is visualized.  Aortic Valve: The aortic valve is trileaflet. Sclerotic aortic valve with normal opening. Peak transaortic gradient equals 5.4 mmHg, mean transaortic gradient equals 2.6 mmHg, the calculated aortic valve area equals 2.76 cm² by the continuity equation consistent with normally opening aortic valve.  Pulmonic Valve: The pulmonic valve was not well visualized. The pulmonic valve is normal. Trace pulmonic valve regurgitation.  Aorta: The aortic root is normal in size and structure.       Summary:   1. Left ventricular ejection fraction, by visual estimation, is 50 to 55%.   2. Normal global left ventricular systolic function.   3. Normal left ventricular internal cavity size.   4. Spectral Doppler shows impaired relaxation pattern of left ventricular myocardial filling (Grade I diastolic dysfunction).   5. There is no evidence of pericardial effusion.   6. Mild mitral valve regurgitation.   7. Thickening of the anterior and posterior mitral valve leaflets.   8. Sclerotic aortic valve with normal opening.   9. LA volume Index is 19.9 ml/m² ml/m2.    [id number] Kleby Vo MD, FACC , Electronically signed on 7/15/2020 at 9:13:11 AM       *** Final ***          KELBY VO M.D., ATTENDING CARDIOLOGIST  This document has been electronically signed. Jul 15 2020  8:05AM        < end of copied text >

## 2020-07-17 NOTE — DISCHARGE NOTE PROVIDER - NSDCCPCAREPLAN_GEN_ALL_CORE_FT
PRINCIPAL DISCHARGE DIAGNOSIS  Diagnosis: Hyperglycemia  Assessment and Plan of Treatment:       SECONDARY DISCHARGE DIAGNOSES  Diagnosis: Atrial fibrillation with RVR  Assessment and Plan of Treatment: PRINCIPAL DISCHARGE DIAGNOSIS  Diagnosis: Hyperglycemia  Assessment and Plan of Treatment: please cont with meds as prescribed . outpt endocrinology follow up      SECONDARY DISCHARGE DIAGNOSES  Diagnosis: Atrial fibrillation with RVR  Assessment and Plan of Treatment: cont meds/cont eliquis as prescribed. Outpt follow up.

## 2020-07-18 ENCOUNTER — TRANSCRIPTION ENCOUNTER (OUTPATIENT)
Age: 65
End: 2020-07-18

## 2020-07-18 VITALS
SYSTOLIC BLOOD PRESSURE: 146 MMHG | TEMPERATURE: 98 F | WEIGHT: 253.53 LBS | HEART RATE: 89 BPM | DIASTOLIC BLOOD PRESSURE: 80 MMHG | RESPIRATION RATE: 16 BRPM | OXYGEN SATURATION: 95 %

## 2020-07-18 LAB
-  AMPICILLIN/SULBACTAM: SIGNIFICANT CHANGE UP
-  CEFAZOLIN: SIGNIFICANT CHANGE UP
-  CLINDAMYCIN: SIGNIFICANT CHANGE UP
-  ERYTHROMYCIN: SIGNIFICANT CHANGE UP
-  GENTAMICIN: SIGNIFICANT CHANGE UP
-  OXACILLIN: SIGNIFICANT CHANGE UP
-  PENICILLIN: SIGNIFICANT CHANGE UP
-  RIFAMPIN: SIGNIFICANT CHANGE UP
-  TETRACYCLINE: SIGNIFICANT CHANGE UP
-  TRIMETHOPRIM/SULFAMETHOXAZOLE: SIGNIFICANT CHANGE UP
-  VANCOMYCIN: SIGNIFICANT CHANGE UP
CULTURE RESULTS: SIGNIFICANT CHANGE UP
GLUCOSE BLDC GLUCOMTR-MCNC: 153 MG/DL — HIGH (ref 70–99)
GLUCOSE BLDC GLUCOMTR-MCNC: 226 MG/DL — HIGH (ref 70–99)
METHOD TYPE: SIGNIFICANT CHANGE UP
ORGANISM # SPEC MICROSCOPIC CNT: SIGNIFICANT CHANGE UP
ORGANISM # SPEC MICROSCOPIC CNT: SIGNIFICANT CHANGE UP
SPECIMEN SOURCE: SIGNIFICANT CHANGE UP

## 2020-07-18 PROCEDURE — 99238 HOSP IP/OBS DSCHRG MGMT 30/<: CPT

## 2020-07-18 RX ORDER — HUMAN INSULIN 100 [IU]/ML
30 INJECTION, SUSPENSION SUBCUTANEOUS
Qty: 30 | Refills: 0
Start: 2020-07-18

## 2020-07-18 RX ORDER — HUMAN INSULIN 100 [IU]/ML
30 INJECTION, SUSPENSION SUBCUTANEOUS
Qty: 30 | Refills: 0
Start: 2020-07-18 | End: 2020-08-16

## 2020-07-18 RX ORDER — METFORMIN HYDROCHLORIDE 850 MG/1
1 TABLET ORAL
Qty: 60 | Refills: 0
Start: 2020-07-18 | End: 2020-08-16

## 2020-07-18 RX ORDER — METOPROLOL TARTRATE 50 MG
1 TABLET ORAL
Qty: 60 | Refills: 0
Start: 2020-07-18 | End: 2020-08-16

## 2020-07-18 RX ORDER — APIXABAN 2.5 MG/1
1 TABLET, FILM COATED ORAL
Qty: 60 | Refills: 0
Start: 2020-07-18 | End: 2020-08-16

## 2020-07-18 RX ORDER — ISOPROPYL ALCOHOL, BENZOCAINE .7; .06 ML/ML; ML/ML
1 SWAB TOPICAL
Qty: 100 | Refills: 1
Start: 2020-07-18 | End: 2020-09-05

## 2020-07-18 RX ORDER — HUMAN INSULIN 100 [IU]/ML
30 INJECTION, SUSPENSION SUBCUTANEOUS
Qty: 60 | Refills: 0
Start: 2020-07-18

## 2020-07-18 RX ADMIN — Medication 6 UNIT(S): at 07:58

## 2020-07-18 RX ADMIN — CHLORHEXIDINE GLUCONATE 1 APPLICATION(S): 213 SOLUTION TOPICAL at 07:59

## 2020-07-18 RX ADMIN — Medication 25 MILLIGRAM(S): at 07:58

## 2020-07-18 RX ADMIN — Medication 2: at 11:37

## 2020-07-18 RX ADMIN — APIXABAN 5 MILLIGRAM(S): 2.5 TABLET, FILM COATED ORAL at 07:58

## 2020-07-18 RX ADMIN — Medication 6 UNIT(S): at 13:06

## 2020-07-18 RX ADMIN — Medication 4: at 07:59

## 2020-07-18 NOTE — CHART NOTE - NSCHARTNOTEFT_GEN_A_CORE
Nutrition Follow Up Note  Hospital Course (Per Electronic Medical Record):   Source: Medical Record [X] Patient [X]  Nursing Staff [X]     Diet: Consistent Carbohydrate/ No snacks     Patient noted with varied po % noted. No GI distress noted. A1c 13.6(7/15). Patient noted dyslexic, . Tried to obtain diet history & explain importance of diet & insulin, utilized diabetes tool kit (A1c pens ) to explain importance of good glycemic control. Patient not very receptive to diet information, encouraged patient to try not skip meals due to possible hypoglycemia.      Current Weight: (7/18) 253.5/115kg ? 5lb weight gain since admission.     Pertinent Medications: MEDICATIONS  (STANDING):  apixaban 5 milliGRAM(s) Oral two times a day  atorvastatin 10 milliGRAM(s) Oral at bedtime  chlorhexidine 2% Cloths 1 Application(s) Topical <User Schedule>  dextrose 5%. 1000 milliLiter(s) (50 mL/Hr) IV Continuous <Continuous>  dextrose 50% Injectable 12.5 Gram(s) IV Push once  dextrose 50% Injectable 25 Gram(s) IV Push once  dextrose 50% Injectable 25 Gram(s) IV Push once  insulin glargine Injectable (LANTUS) 35 Unit(s) SubCutaneous at bedtime  insulin lispro (HumaLOG) corrective regimen sliding scale   SubCutaneous three times a day before meals  insulin lispro Injectable (HumaLOG) 6 Unit(s) SubCutaneous before breakfast  insulin lispro Injectable (HumaLOG) 6 Unit(s) SubCutaneous before lunch  insulin lispro Injectable (HumaLOG) 6 Unit(s) SubCutaneous before dinner  metoprolol tartrate 25 milliGRAM(s) Oral two times a day    MEDICATIONS  (PRN):  dextrose 40% Gel 15 Gram(s) Oral once PRN Blood Glucose LESS THAN 70 milliGRAM(s)/deciliter  glucagon  Injectable 1 milliGRAM(s) IntraMuscular once PRN Glucose LESS THAN 70 milligrams/deciliter      Pertinent Labs:  07-17 Na138 mmol/L Glu 183 mg/dL<H> K+ 3.8 mmol/L Cr  0.66 mg/dL BUN 14 mg/dL 07-15 Phos 3.1 mg/dL 07-15 Alb 3.0 g/dL<L> 07-15 Chol 195 mg/dL  mg/dL<H> HDL 50 mg/dL Trig 170 mg/dL<H>  POCT 185,184,221,176      Skin: intact    Edema: none    Last BM: none noted     Estimated Needs:   [X] No Change since Previous Assessment      Previous Nutrition Diagnosis: Altered Nutrition related labs     Nutrition Diagnosis is [X] Resolving due to insulin therapy        New Nutrition Diagnosis: [X] Not Applicable      Interventions:   1. Recommend continue current diet       Monitoring & Evaluation: will monitor:  [X] Weights   [X] PO Intake   [X] Follow Up (Per Protocol)  [X] Tolerance to Diet Prescription       RD to follow as per Nutrition protocol  Lindsay Egan RDN

## 2020-07-18 NOTE — DISCHARGE NOTE NURSING/CASE MANAGEMENT/SOCIAL WORK - PATIENT PORTAL LINK FT
You can access the FollowMyHealth Patient Portal offered by [facility] by registering at the following website: [email or web address]. By joining [employer]’s FollowMyHealth portal, you will also be able to view your health information using other applications (apps) compatible with our system.

## 2020-07-18 NOTE — PROGRESS NOTE ADULT - ASSESSMENT
65 yo male with DM II, HTN, and HLD admitted 7/14 with a syncopal event. Found to be in A fib with RVR and DKA.      Afib with RVR - new onset  -Possible SDH on initial CT but repeat CT head with no SDH  Neuro consult-Dr Eddy--ok to start AC--started eliquis  Rate control with Lopressor   Cardio following  Monitor on tele w no acute events --now in sinus     DKA - now resolved   Uncontrolled DM2 - A1C 13.6   - s/p ICU   new accucheck given to pt   diabetic education with follow up with Lana JAMES NP  Lantus increased 35 iu   accucheck and ISS  Endocrinology following--discharged on NPH BID    Hypernatremia/Dehydration- resolved   IV hydration     Bacteremia- coag neg staph  3/4 blood cx with staph hominis--probable contaminant  ID following  Repeat blood cx 7/16 NGTD   - ID following  Remains afebrile with no leukocytosis   -off abx     HTN  currently stable   Continue metoprolol, cardizam     Case d.w Dr Veloz                    Goals of Care: Full code  Dispo: home with home care.  Confirmed that rx will be covered by insurance

## 2020-07-18 NOTE — PROGRESS NOTE ADULT - ATTENDING COMMENTS
I have personally seen and examined patient on the above date.  I discussed the case with MITCHELL Klein and I agree with findings and plan as detailed per note above, which I have amended where appropriate.

## 2020-07-18 NOTE — PROGRESS NOTE ADULT - SUBJECTIVE AND OBJECTIVE BOX
Patient is a 64y old  Male who presents with a chief complaint of DKA and afib with RVR (17 Jul 2020 17:13). No acute issues overnight . Eager to go home      Patient seen and examined at bedside.    ALLERGIES:  No Known Allergies    MEDICATIONS  (STANDING):  apixaban 5 milliGRAM(s) Oral two times a day  atorvastatin 10 milliGRAM(s) Oral at bedtime  chlorhexidine 2% Cloths 1 Application(s) Topical <User Schedule>  dextrose 5%. 1000 milliLiter(s) (50 mL/Hr) IV Continuous <Continuous>  dextrose 50% Injectable 12.5 Gram(s) IV Push once  dextrose 50% Injectable 25 Gram(s) IV Push once  dextrose 50% Injectable 25 Gram(s) IV Push once  insulin glargine Injectable (LANTUS) 35 Unit(s) SubCutaneous at bedtime  insulin lispro (HumaLOG) corrective regimen sliding scale   SubCutaneous three times a day before meals  insulin lispro Injectable (HumaLOG) 6 Unit(s) SubCutaneous before breakfast  insulin lispro Injectable (HumaLOG) 6 Unit(s) SubCutaneous before lunch  insulin lispro Injectable (HumaLOG) 6 Unit(s) SubCutaneous before dinner  metoprolol tartrate 25 milliGRAM(s) Oral two times a day    MEDICATIONS  (PRN):  dextrose 40% Gel 15 Gram(s) Oral once PRN Blood Glucose LESS THAN 70 milliGRAM(s)/deciliter  glucagon  Injectable 1 milliGRAM(s) IntraMuscular once PRN Glucose LESS THAN 70 milligrams/deciliter    Vital Signs Last 24 Hrs  T(F): 98.2 (18 Jul 2020 05:31), Max: 98.4 (17 Jul 2020 11:11)  HR: 89 (18 Jul 2020 05:31) (75 - 89)  BP: 146/80 (18 Jul 2020 05:31) (127/84 - 146/80)  RR: 16 (18 Jul 2020 05:31) (15 - 16)  SpO2: 95% (18 Jul 2020 05:31) (94% - 99%)  I&O's Summary    17 Jul 2020 07:01  -  18 Jul 2020 07:00  --------------------------------------------------------  IN: 800 mL / OUT: 1100 mL / NET: -300 mL    18 Jul 2020 07:01  -  18 Jul 2020 10:56  --------------------------------------------------------  IN: 0 mL / OUT: 400 mL / NET: -400 mL      BMI (kg/m2): 30.1 (07-15-20 @ 01:55)  PHYSICAL EXAM:  General: NAD, A/O x 3 poor dentition  ENT: MMM  Neck: Supple, No JVD  Lungs: Clear to auscultation bilaterally  Cardio: RRR, S1/S2, No pitting edema   Abdomen: Soft, Nontender, Nondistended; Bowel sounds present  Extremities: No calf tenderness, moves all extremities     LABS:                        14.8   8.79  )-----------( 168      ( 17 Jul 2020 05:30 )             43.2       07-17    138  |  104  |  14  ----------------------------<  183  3.8   |  23  |  0.66    Ca    8.3      17 Jul 2020 05:30  Phos  3.1     07-15  Mg     2.2     07-15       eGFR if Non African American: 103 mL/min/1.73M2 (07-17-20 @ 05:30)  eGFR if African American: 120 mL/min/1.73M2 (07-17-20 @ 05:30)             07-15 Chol 195 mg/dL  mg/dL HDL 50 mg/dL Trig 170 mg/dL              POCT Blood Glucose.: 226 mg/dL (18 Jul 2020 07:57)  POCT Blood Glucose.: 185 mg/dL (17 Jul 2020 21:05)  POCT Blood Glucose.: 184 mg/dL (17 Jul 2020 17:04)  POCT Blood Glucose.: 221 mg/dL (17 Jul 2020 13:13)          Culture - Blood (collected 16 Jul 2020 10:29)  Source: .Blood Blood-Peripheral  Preliminary Report (17 Jul 2020 13:02):    No growth to date.    Culture - Blood (collected 16 Jul 2020 10:28)  Source: .Blood Blood-Peripheral  Preliminary Report (17 Jul 2020 13:02):    No growth to date.    Culture - Urine (collected 14 Jul 2020 20:20)  Source: .Urine Clean Catch (Midstream)  Final Report (16 Jul 2020 02:52):    <10,000 CFU/mL Normal Urogenital Dolores    Culture - Blood (collected 14 Jul 2020 20:20)  Source: .Blood Blood-Peripheral  Gram Stain (15 Jul 2020 23:07):    Growth in aerobic bottle: Gram Positive Cocci in Clusters  Preliminary Report (16 Jul 2020 20:31):    Growth in aerobic bottle: Staphylococcus hominis    Culture - Blood (collected 14 Jul 2020 20:20)  Source: .Blood Blood-Peripheral  Gram Stain (15 Jul 2020 18:41):    Growth in anaerobic bottle: Gram Positive Cocci in Clusters    Growth in aerobic bottle: Gram Positive Cocci in Clusters  Final Report (16 Jul 2020 19:35):    Growth in aerobic and anaerobic bottles: Staphylococcus hominis    "Due to technical problems, Proteus sp. will Not be reported as part of    the BCID panel until further notice"    ***Blood Panel PCR results on this specimen are available    approximately3 hours after the Gram stain result.***    Gram stain, PCR, and/or culture results may not always    correspond due to difference in methodologies.    ************************************************************    This PCR assay was performed using [employer].    The following targets are tested for: Enterococcus,    vancomycin resistant enterococci, Listeria monocytogenes,    coagulase negative staphylococci, S. aureus,    methicillin resistant S. aureus, Streptococcus agalactiae    (Group B), S. pneumoniae, S. pyogenes (Group A),    Acinetobacter baumannii, Enterobacter cloacae, E. coli,    Klebsiella oxytoca, K. pneumoniae, Proteus sp.,    Serratia marcescens, Haemophilus influenzae,    Neisseria meningitidis, Pseudomonas aeruginosa, Candida    albicans, C. glabrata, C krusei, C parapsilosis,    C. tropicalis and the KPC resistance gene.  Organism: Blood Culture PCR (16 Jul 2020 19:35)  Organism: Blood Culture PCR (16 Jul 2020 19:35)      -  Coagulase negative Staphylococcus: Detec      Method Type: PCR        RADIOLOGY & ADDITIONAL TESTS:    Care Discussed with Consultants/Other Providers:

## 2020-07-18 NOTE — PROGRESS NOTE ADULT - REASON FOR ADMISSION
DKA and afib with RVR

## 2020-07-18 NOTE — PROGRESS NOTE ADULT - PROVIDER SPECIALTY LIST ADULT
Cardiology
Cardiology
Critical Care
Critical Care
Diabetes
Hospitalist
Infectious Disease
Pulmonology
MICU

## 2020-07-18 NOTE — DISCHARGE NOTE NURSING/CASE MANAGEMENT/SOCIAL WORK - NSDCFUADDAPPT_GEN_ALL_CORE_FT
Follow up with endocrinology , Dr Kraft  We made you an appointment with Dr. Prince on Monday 7/20/2020 at 12:30pm.

## 2020-07-18 NOTE — CHART NOTE - NSCHARTNOTEFT_GEN_A_CORE
Spoke with [facility] pharmacy. Humalin is NOT covered by insurance.  Spoke to Lana Martin, who recommended Novolin N 30 iu BID and to send rx to [facility].   Rx sent and patient sister notified who reports she will relay message to patient to  rx today. Expressed importance of patient taking insulin.

## 2020-07-21 LAB
CULTURE RESULTS: SIGNIFICANT CHANGE UP
CULTURE RESULTS: SIGNIFICANT CHANGE UP
SPECIMEN SOURCE: SIGNIFICANT CHANGE UP
SPECIMEN SOURCE: SIGNIFICANT CHANGE UP

## 2020-07-29 PROCEDURE — 96376 TX/PRO/DX INJ SAME DRUG ADON: CPT

## 2020-07-29 PROCEDURE — 85610 PROTHROMBIN TIME: CPT

## 2020-07-29 PROCEDURE — 90715 TDAP VACCINE 7 YRS/> IM: CPT

## 2020-07-29 PROCEDURE — 36415 COLL VENOUS BLD VENIPUNCTURE: CPT

## 2020-07-29 PROCEDURE — 97116 GAIT TRAINING THERAPY: CPT

## 2020-07-29 PROCEDURE — 86769 SARS-COV-2 COVID-19 ANTIBODY: CPT

## 2020-07-29 PROCEDURE — 80048 BASIC METABOLIC PNL TOTAL CA: CPT

## 2020-07-29 PROCEDURE — U0003: CPT

## 2020-07-29 PROCEDURE — 80061 LIPID PANEL: CPT

## 2020-07-29 PROCEDURE — 80307 DRUG TEST PRSMV CHEM ANLYZR: CPT

## 2020-07-29 PROCEDURE — 84484 ASSAY OF TROPONIN QUANT: CPT

## 2020-07-29 PROCEDURE — 93306 TTE W/DOPPLER COMPLETE: CPT

## 2020-07-29 PROCEDURE — 82962 GLUCOSE BLOOD TEST: CPT

## 2020-07-29 PROCEDURE — 87040 BLOOD CULTURE FOR BACTERIA: CPT

## 2020-07-29 PROCEDURE — 90471 IMMUNIZATION ADMIN: CPT

## 2020-07-29 PROCEDURE — 99291 CRITICAL CARE FIRST HOUR: CPT | Mod: 25

## 2020-07-29 PROCEDURE — 81001 URINALYSIS AUTO W/SCOPE: CPT

## 2020-07-29 PROCEDURE — 87186 SC STD MICRODIL/AGAR DIL: CPT

## 2020-07-29 PROCEDURE — 85027 COMPLETE CBC AUTOMATED: CPT

## 2020-07-29 PROCEDURE — 80053 COMPREHEN METABOLIC PANEL: CPT

## 2020-07-29 PROCEDURE — 84443 ASSAY THYROID STIM HORMONE: CPT

## 2020-07-29 PROCEDURE — 87150 DNA/RNA AMPLIFIED PROBE: CPT

## 2020-07-29 PROCEDURE — 83036 HEMOGLOBIN GLYCOSYLATED A1C: CPT

## 2020-07-29 PROCEDURE — 96374 THER/PROPH/DIAG INJ IV PUSH: CPT

## 2020-07-29 PROCEDURE — 86803 HEPATITIS C AB TEST: CPT

## 2020-07-29 PROCEDURE — 87086 URINE CULTURE/COLONY COUNT: CPT

## 2020-07-29 PROCEDURE — 85730 THROMBOPLASTIN TIME PARTIAL: CPT

## 2020-07-29 PROCEDURE — 96375 TX/PRO/DX INJ NEW DRUG ADDON: CPT

## 2020-07-29 PROCEDURE — 83605 ASSAY OF LACTIC ACID: CPT

## 2020-07-29 PROCEDURE — 84100 ASSAY OF PHOSPHORUS: CPT

## 2020-07-29 PROCEDURE — 93005 ELECTROCARDIOGRAM TRACING: CPT

## 2020-07-29 PROCEDURE — 83735 ASSAY OF MAGNESIUM: CPT

## 2020-07-29 PROCEDURE — 97162 PT EVAL MOD COMPLEX 30 MIN: CPT

## 2020-07-29 PROCEDURE — 82803 BLOOD GASES ANY COMBINATION: CPT

## 2020-07-29 PROCEDURE — 70450 CT HEAD/BRAIN W/O DYE: CPT

## 2020-07-29 PROCEDURE — 71045 X-RAY EXAM CHEST 1 VIEW: CPT

## 2020-07-29 PROCEDURE — 82009 KETONE BODYS QUAL: CPT

## 2020-08-20 ENCOUNTER — INPATIENT (INPATIENT)
Facility: HOSPITAL | Age: 65
LOS: 1 days | Discharge: HOME | DRG: 639 | End: 2020-08-22
Attending: STUDENT IN AN ORGANIZED HEALTH CARE EDUCATION/TRAINING PROGRAM | Admitting: INTERNAL MEDICINE
Payer: MEDICARE

## 2020-08-20 VITALS
DIASTOLIC BLOOD PRESSURE: 81 MMHG | HEART RATE: 105 BPM | HEIGHT: 76 IN | SYSTOLIC BLOOD PRESSURE: 162 MMHG | OXYGEN SATURATION: 97 % | RESPIRATION RATE: 18 BRPM | TEMPERATURE: 98 F | WEIGHT: 250 LBS

## 2020-08-20 DIAGNOSIS — R26.81 UNSTEADINESS ON FEET: ICD-10-CM

## 2020-08-20 LAB
ALBUMIN SERPL ELPH-MCNC: 3.2 G/DL — LOW (ref 3.3–5)
ALP SERPL-CCNC: 147 U/L — HIGH (ref 40–120)
ALT FLD-CCNC: 47 U/L — HIGH (ref 10–45)
ANION GAP SERPL CALC-SCNC: 9 MMOL/L — SIGNIFICANT CHANGE UP (ref 5–17)
APPEARANCE UR: CLEAR — SIGNIFICANT CHANGE UP
APTT BLD: 28.6 SEC — SIGNIFICANT CHANGE UP (ref 27.5–35.5)
AST SERPL-CCNC: 22 U/L — SIGNIFICANT CHANGE UP (ref 10–40)
B-OH-BUTYR SERPL-SCNC: 0.4 MMOL/L — SIGNIFICANT CHANGE UP
BASOPHILS # BLD AUTO: 0.04 K/UL — SIGNIFICANT CHANGE UP (ref 0–0.2)
BASOPHILS NFR BLD AUTO: 0.5 % — SIGNIFICANT CHANGE UP (ref 0–2)
BILIRUB SERPL-MCNC: 0.4 MG/DL — SIGNIFICANT CHANGE UP (ref 0.2–1.2)
BILIRUB UR-MCNC: NEGATIVE — SIGNIFICANT CHANGE UP
BUN SERPL-MCNC: 23 MG/DL — SIGNIFICANT CHANGE UP (ref 7–23)
CALCIUM SERPL-MCNC: 9.4 MG/DL — SIGNIFICANT CHANGE UP (ref 8.4–10.5)
CHLORIDE SERPL-SCNC: 100 MMOL/L — SIGNIFICANT CHANGE UP (ref 96–108)
CO2 BLDV-SCNC: 19 MMOL/L — LOW (ref 21–29)
CO2 SERPL-SCNC: 23 MMOL/L — SIGNIFICANT CHANGE UP (ref 22–31)
COLOR SPEC: YELLOW — SIGNIFICANT CHANGE UP
CREAT SERPL-MCNC: 0.93 MG/DL — SIGNIFICANT CHANGE UP (ref 0.5–1.3)
DIFF PNL FLD: NEGATIVE — SIGNIFICANT CHANGE UP
EOSINOPHIL # BLD AUTO: 0.22 K/UL — SIGNIFICANT CHANGE UP (ref 0–0.5)
EOSINOPHIL NFR BLD AUTO: 2.6 % — SIGNIFICANT CHANGE UP (ref 0–6)
GAS PNL BLDV: SIGNIFICANT CHANGE UP
GLUCOSE BLDC GLUCOMTR-MCNC: 248 MG/DL — HIGH (ref 70–99)
GLUCOSE BLDC GLUCOMTR-MCNC: 268 MG/DL — HIGH (ref 70–99)
GLUCOSE BLDC GLUCOMTR-MCNC: 288 MG/DL — HIGH (ref 70–99)
GLUCOSE BLDC GLUCOMTR-MCNC: 391 MG/DL — HIGH (ref 70–99)
GLUCOSE BLDC GLUCOMTR-MCNC: 432 MG/DL — HIGH (ref 70–99)
GLUCOSE SERPL-MCNC: 465 MG/DL — CRITICAL HIGH (ref 70–99)
GLUCOSE UR QL: 1000 MG/DL
HCT VFR BLD CALC: 44.8 % — SIGNIFICANT CHANGE UP (ref 39–50)
HGB BLD-MCNC: 15.6 G/DL — SIGNIFICANT CHANGE UP (ref 13–17)
IMM GRANULOCYTES NFR BLD AUTO: 0.7 % — SIGNIFICANT CHANGE UP (ref 0–1.5)
INR BLD: 0.96 RATIO — SIGNIFICANT CHANGE UP (ref 0.88–1.16)
KETONES UR-MCNC: NEGATIVE — SIGNIFICANT CHANGE UP
LEUKOCYTE ESTERASE UR-ACNC: NEGATIVE — SIGNIFICANT CHANGE UP
LYMPHOCYTES # BLD AUTO: 1.21 K/UL — SIGNIFICANT CHANGE UP (ref 1–3.3)
LYMPHOCYTES # BLD AUTO: 14.4 % — SIGNIFICANT CHANGE UP (ref 13–44)
MCHC RBC-ENTMCNC: 29.9 PG — SIGNIFICANT CHANGE UP (ref 27–34)
MCHC RBC-ENTMCNC: 34.8 GM/DL — SIGNIFICANT CHANGE UP (ref 32–36)
MCV RBC AUTO: 85.8 FL — SIGNIFICANT CHANGE UP (ref 80–100)
MONOCYTES # BLD AUTO: 0.62 K/UL — SIGNIFICANT CHANGE UP (ref 0–0.9)
MONOCYTES NFR BLD AUTO: 7.4 % — SIGNIFICANT CHANGE UP (ref 2–14)
NEUTROPHILS # BLD AUTO: 6.28 K/UL — SIGNIFICANT CHANGE UP (ref 1.8–7.4)
NEUTROPHILS NFR BLD AUTO: 74.4 % — SIGNIFICANT CHANGE UP (ref 43–77)
NITRITE UR-MCNC: NEGATIVE — SIGNIFICANT CHANGE UP
NRBC # BLD: 0 /100 WBCS — SIGNIFICANT CHANGE UP (ref 0–0)
PCO2 BLDV: 30 MMHG — LOW (ref 35–50)
PH BLDV: 7.38 — SIGNIFICANT CHANGE UP (ref 7.35–7.45)
PH UR: 5 — SIGNIFICANT CHANGE UP (ref 5–8)
PLATELET # BLD AUTO: 237 K/UL — SIGNIFICANT CHANGE UP (ref 150–400)
PO2 BLDV: <25 MMHG — SIGNIFICANT CHANGE UP (ref 25–45)
POTASSIUM SERPL-MCNC: 4.9 MMOL/L — SIGNIFICANT CHANGE UP (ref 3.5–5.3)
POTASSIUM SERPL-SCNC: 4.9 MMOL/L — SIGNIFICANT CHANGE UP (ref 3.5–5.3)
PROT SERPL-MCNC: 6.9 G/DL — SIGNIFICANT CHANGE UP (ref 6–8.3)
PROT UR-MCNC: NEGATIVE — SIGNIFICANT CHANGE UP
PROTHROM AB SERPL-ACNC: 11.6 SEC — SIGNIFICANT CHANGE UP (ref 10.6–13.6)
RBC # BLD: 5.22 M/UL — SIGNIFICANT CHANGE UP (ref 4.2–5.8)
RBC # FLD: 12.5 % — SIGNIFICANT CHANGE UP (ref 10.3–14.5)
SAO2 % BLDV: 74 % — SIGNIFICANT CHANGE UP (ref 67–88)
SODIUM SERPL-SCNC: 132 MMOL/L — LOW (ref 135–145)
SP GR SPEC: 1.01 — SIGNIFICANT CHANGE UP (ref 1.01–1.02)
UROBILINOGEN FLD QL: NEGATIVE — SIGNIFICANT CHANGE UP
WBC # BLD: 8.43 K/UL — SIGNIFICANT CHANGE UP (ref 3.8–10.5)
WBC # FLD AUTO: 8.43 K/UL — SIGNIFICANT CHANGE UP (ref 3.8–10.5)

## 2020-08-20 PROCEDURE — 70450 CT HEAD/BRAIN W/O DYE: CPT | Mod: 26

## 2020-08-20 PROCEDURE — 99285 EMERGENCY DEPT VISIT HI MDM: CPT | Mod: CS

## 2020-08-20 PROCEDURE — 93010 ELECTROCARDIOGRAM REPORT: CPT

## 2020-08-20 PROCEDURE — 99223 1ST HOSP IP/OBS HIGH 75: CPT

## 2020-08-20 RX ORDER — SODIUM CHLORIDE 9 MG/ML
500 INJECTION INTRAMUSCULAR; INTRAVENOUS; SUBCUTANEOUS ONCE
Refills: 0 | Status: COMPLETED | OUTPATIENT
Start: 2020-08-20 | End: 2020-08-20

## 2020-08-20 RX ORDER — INSULIN LISPRO 100/ML
VIAL (ML) SUBCUTANEOUS
Refills: 0 | Status: DISCONTINUED | OUTPATIENT
Start: 2020-08-20 | End: 2020-08-22

## 2020-08-20 RX ORDER — INSULIN LISPRO 100/ML
VIAL (ML) SUBCUTANEOUS AT BEDTIME
Refills: 0 | Status: DISCONTINUED | OUTPATIENT
Start: 2020-08-20 | End: 2020-08-22

## 2020-08-20 RX ORDER — SODIUM CHLORIDE 9 MG/ML
1000 INJECTION, SOLUTION INTRAVENOUS
Refills: 0 | Status: DISCONTINUED | OUTPATIENT
Start: 2020-08-20 | End: 2020-08-22

## 2020-08-20 RX ORDER — GLUCAGON INJECTION, SOLUTION 0.5 MG/.1ML
1 INJECTION, SOLUTION SUBCUTANEOUS ONCE
Refills: 0 | Status: DISCONTINUED | OUTPATIENT
Start: 2020-08-20 | End: 2020-08-22

## 2020-08-20 RX ORDER — INSULIN HUMAN 100 [IU]/ML
6 INJECTION, SOLUTION SUBCUTANEOUS ONCE
Refills: 0 | Status: COMPLETED | OUTPATIENT
Start: 2020-08-20 | End: 2020-08-20

## 2020-08-20 RX ORDER — ENOXAPARIN SODIUM 100 MG/ML
40 INJECTION SUBCUTANEOUS DAILY
Refills: 0 | Status: DISCONTINUED | OUTPATIENT
Start: 2020-08-20 | End: 2020-08-21

## 2020-08-20 RX ORDER — ACETAMINOPHEN 500 MG
650 TABLET ORAL EVERY 6 HOURS
Refills: 0 | Status: DISCONTINUED | OUTPATIENT
Start: 2020-08-20 | End: 2020-08-22

## 2020-08-20 RX ORDER — DEXTROSE 50 % IN WATER 50 %
25 SYRINGE (ML) INTRAVENOUS ONCE
Refills: 0 | Status: DISCONTINUED | OUTPATIENT
Start: 2020-08-20 | End: 2020-08-22

## 2020-08-20 RX ORDER — DEXTROSE 50 % IN WATER 50 %
12.5 SYRINGE (ML) INTRAVENOUS ONCE
Refills: 0 | Status: DISCONTINUED | OUTPATIENT
Start: 2020-08-20 | End: 2020-08-22

## 2020-08-20 RX ORDER — PANTOPRAZOLE SODIUM 20 MG/1
40 TABLET, DELAYED RELEASE ORAL
Refills: 0 | Status: DISCONTINUED | OUTPATIENT
Start: 2020-08-20 | End: 2020-08-22

## 2020-08-20 RX ORDER — DEXTROSE 50 % IN WATER 50 %
15 SYRINGE (ML) INTRAVENOUS ONCE
Refills: 0 | Status: DISCONTINUED | OUTPATIENT
Start: 2020-08-20 | End: 2020-08-22

## 2020-08-20 RX ORDER — LISINOPRIL 2.5 MG/1
20 TABLET ORAL DAILY
Refills: 0 | Status: DISCONTINUED | OUTPATIENT
Start: 2020-08-20 | End: 2020-08-22

## 2020-08-20 RX ORDER — SODIUM CHLORIDE 9 MG/ML
1000 INJECTION INTRAMUSCULAR; INTRAVENOUS; SUBCUTANEOUS ONCE
Refills: 0 | Status: COMPLETED | OUTPATIENT
Start: 2020-08-20 | End: 2020-08-20

## 2020-08-20 RX ORDER — ATORVASTATIN CALCIUM 80 MG/1
20 TABLET, FILM COATED ORAL AT BEDTIME
Refills: 0 | Status: DISCONTINUED | OUTPATIENT
Start: 2020-08-20 | End: 2020-08-22

## 2020-08-20 RX ORDER — INSULIN LISPRO 100/ML
10 VIAL (ML) SUBCUTANEOUS
Refills: 0 | Status: DISCONTINUED | OUTPATIENT
Start: 2020-08-20 | End: 2020-08-22

## 2020-08-20 RX ORDER — INSULIN GLARGINE 100 [IU]/ML
30 INJECTION, SOLUTION SUBCUTANEOUS AT BEDTIME
Refills: 0 | Status: DISCONTINUED | OUTPATIENT
Start: 2020-08-20 | End: 2020-08-21

## 2020-08-20 RX ADMIN — SODIUM CHLORIDE 500 MILLILITER(S): 9 INJECTION INTRAMUSCULAR; INTRAVENOUS; SUBCUTANEOUS at 18:18

## 2020-08-20 RX ADMIN — INSULIN GLARGINE 30 UNIT(S): 100 INJECTION, SOLUTION SUBCUTANEOUS at 23:29

## 2020-08-20 RX ADMIN — INSULIN HUMAN 6 UNIT(S): 100 INJECTION, SOLUTION SUBCUTANEOUS at 17:40

## 2020-08-20 RX ADMIN — ATORVASTATIN CALCIUM 20 MILLIGRAM(S): 80 TABLET, FILM COATED ORAL at 23:29

## 2020-08-20 RX ADMIN — SODIUM CHLORIDE 1000 MILLILITER(S): 9 INJECTION INTRAMUSCULAR; INTRAVENOUS; SUBCUTANEOUS at 16:33

## 2020-08-20 NOTE — H&P ADULT - NSHPLABSRESULTS_GEN_ALL_CORE
15.6   8.43  )-----------( 237      ( 20 Aug 2020 16:35 )             44.8           132<L>  |  100  |  23  ----------------------------<  465<HH>  4.9   |  23  |  0.93    Ca    9.4      20 Aug 2020 16:35    TPro  6.9  /  Alb  3.2<L>  /  TBili  0.4  /  DBili  x   /  AST  22  /  ALT  47<H>  /  AlkPhos  147<H>           LIVER FUNCTIONS - ( 20 Aug 2020 16:35 )  Alb: 3.2 g/dL / Pro: 6.9 g/dL / ALK PHOS: 147 U/L / ALT: 47 U/L / AST: 22 U/L / GGT: x               PT/INR - ( 20 Aug 2020 16:35 )   PT: 11.6 sec;   INR: 0.96 ratio         PTT - ( 20 Aug 2020 16:35 )  PTT:28.6 sec          Urinalysis Basic - ( 20 Aug 2020 17:10 )    Color: Yellow / Appearance: Clear / S.015 / pH: x  Gluc: x / Ketone: Negative  / Bili: Negative / Urobili: Negative   Blood: x / Protein: Negative / Nitrite: Negative   Leuk Esterase: Negative / RBC: x / WBC x   Sq Epi: x / Non Sq Epi: x / Bacteria: x        CAPILLARY BLOOD GLUCOSE      POCT Blood Glucose.: 268 mg/dL (20 Aug 2020 20:28)  POCT Blood Glucose.: 288 mg/dL (20 Aug 2020 18:37)  POCT Blood Glucose.: 391 mg/dL (20 Aug 2020 17:35)  POCT Blood Glucose.: 432 mg/dL (20 Aug 2020 16:11)        EKG: NSR at 88bpm, q in III, no acute ST changes      CXR: wet read PENDING    < from: CT Head No Cont (20 @ 16:35) >      IMPRESSION:  No acute intracranial hemorrhage or acute territorial infarct.  If symptoms persist, follow-up MRI exam recommended.    < end of copied text >
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
## 2020-08-20 NOTE — ED ADULT NURSE REASSESSMENT NOTE - NS ED NURSE REASSESS COMMENT FT1
Pt was unsure of the medications he takes. He states he isn't sure and doesn't have them written down. There is medications in the med rec but could not verify them with pt at this time.
Patient decided not to leave against medical advice and get admitted to the hospital. Dr. Burk at bedside. Will continue to monitor. Safety maintained. Sandra Scott RN

## 2020-08-20 NOTE — ED PROVIDER NOTE - ATTENDING CONTRIBUTION TO CARE
Pt is 65 y/o male with PMhx of dyslexia, DM, HTN, HLD, a-fib on Eliquis here for eval of hyperglycemia. Pt went to see his PCP Dr Prince  for regular check up, was noted to have elevated FS in the office - " it was above 500". Pt is on Metformin 1000mg BID as well as Novolin N - 30units BID - both of which he states he has been taking but admits he "may forget as he dyslexia", not sure if pt is dietary compliant. Denies cp, sob, denies abd pain, n/v./d, denies fever/chills/recent infections, denies cough, recent travel or Covid exposure.  in the ER - will check electrolytes, CXR, urine, IV hydration, will reasses;  Dr. Mcdonald:  I have reviewed and discussed with the PA/ resident the case specifics, including the history, physical assessment, evaluation, conclusion, laboratory results, and medical plan. I agree with the contents, and conclusions. I have personally examined, and interviewed the patient.

## 2020-08-20 NOTE — H&P ADULT - NSHPPHYSICALEXAM_GEN_ALL_CORE
Vital Signs Last 24 Hrs  T(C): 36.6 (20 Aug 2020 20:19), Max: 36.6 (20 Aug 2020 16:06)  T(F): 97.9 (20 Aug 2020 20:19), Max: 97.9 (20 Aug 2020 16:06)  HR: 89 (20 Aug 2020 20:19) (89 - 105)  BP: 148/91 (20 Aug 2020 20:19) (148/91 - 162/81)  BP(mean): --  RR: 18 (20 Aug 2020 20:19) (18 - 18)  SpO2: 95% (20 Aug 2020 20:19) (95% - 97%)  Daily Height in cm: 193.04 (20 Aug 2020 16:06)    Daily   CAPILLARY BLOOD GLUCOSE      POCT Blood Glucose.: 268 mg/dL (20 Aug 2020 20:28)    I&O's Summary      GENERAL: NAD  HEAD:  Normocephalic  EYES: EOMI, PERRLA, conjunctiva and sclera clear  ENMT: No tonsillar erythema, exudates, or enlargement; Moist mucous membranes, No lesions  NECK: Supple, No JVD, no bruit, normal thyroid  NERVOUS SYSTEM:  Alert & Oriented X3, Good concentration; grossly  Motor Strength 5/5 B/L upper and lower extremities; DTRs 2+ intact and symmetric. finger nose and heel shin intact.   CHEST/LUNG: Clear to auscultation bilaterally; No rales, rhonchi, wheezing, or rubs  HEART: Regular rate and rhythm; No murmurs, rubs, or gallops  ABDOMEN: Soft, Nontender, Nondistended; Bowel sounds present  EXTREMITIES:  2+ Peripheral Pulses, No clubbing, cyanosis, or edema  LYMPH: No lymphadenopathy noted  SKIN: No rashes or lesions

## 2020-08-20 NOTE — ED PROVIDER NOTE - CLINICAL SUMMARY MEDICAL DECISION MAKING FREE TEXT BOX
Pt is 65 y/o male with PMhx of dyslexia, DM, HTN, HLD, a-fib on Eliquis here for eval of hyperglycemia. Pt went to see his PCP Dr Prince  for regular check up, was noted to have elevated FS in the office - " it was above 500". Pt is on Metformin 1000mg BID as well as Novolin N - 30units BID - both of which he states he has been taking but admits he "may forget as he dyslexia", not sure if pt is dietary compliant. Denies cp, sob, denies abd pain, n/v./d, denies fever/chills/recent infections, denies cough, recent travel or Covid exposure.  in the ER - will check electrolytes, CXR, urine, IV hydration, will reasses;

## 2020-08-20 NOTE — H&P ADULT - NSHPREVIEWOFSYSTEMS_GEN_ALL_CORE
CONSTITUTIONAL: No fever, weight loss, or fatigue  EYES: No eye pain, visual disturbances, or discharge  ENMT:  No difficulty hearing, tinnitus, vertigo; No sinus or throat pain  NECK: No pain or stiffness  RESPIRATORY: No cough, wheezing, chills or hemoptysis; No shortness of breath  CARDIOVASCULAR: No chest pain, palpitations, ++dizziness, or leg swelling  GASTROINTESTINAL: No abdominal or epigastric pain. No nausea, vomiting, or hematemesis; No diarrhea or constipation. No melena or hematochezia. +polyuria, +polydipsia  GENITOURINARY: No dysuria, frequency, hematuria, or incontinence  NEUROLOGICAL: No headaches, memory loss, loss of strength, numbness, or tremors  SKIN: No itching, burning, rashes, or lesions   LYMPH NODES: No enlarged glands  ENDOCRINE: No heat or cold intolerance; No hair loss  MUSCULOSKELETAL: No joint pain or swelling; No muscle, back, or extremity pain  PSYCHIATRIC: No depression, anxiety, mood swings, or difficulty sleeping  HEME/LYMPH: No easy bruising, or bleeding gums  ALLERY AND IMMUNOLOGIC: No hives or eczema

## 2020-08-20 NOTE — H&P ADULT - HISTORY OF PRESENT ILLNESS
64M hx of dyslexia, HTN, HLD, T2IRDM s/p recent D/C 7/18/20 from [facility] for DKA and new onset rapid afib. Pt was D/C on Lopressor and Eliquis but never did fill the prescriptions because he did not have enough money after he paid for his sister's medications. He has been taking his old regiment of meds as listed on Med rec. He presented to PMD today after falling 3 nights in a row while he was working on his backyard. All episodes preceded with sxs of lightheadedness. No LOC but had hit his head on concrete each time. Denied any antecedent CP, SOB, palps, diaphoresis. No recent fevers, cough, chills, NVD. +polyuria and polydipsia. No falls in the past 2 days but went to PMD for evaluation today and noted FS in the 500s and sent pt to ED. In ED, s/p 6U IV insulin and now FS: 268. Pt relates he is mostly compliant with insulin but occ miss a dose at night. CT head neg. Pt noted to have unsteady gait and advised admission but initially signed out AMA and now agreed to admission. He relates his gait is usually unsteady and walks with his shopping cart as a makeshift walker. While walking in the hallway, pt noted to be using handrail - denied weakness but more sensation of dizziness. Pt lives alone.

## 2020-08-20 NOTE — ED PROVIDER NOTE - NSFOLLOWUPINSTRUCTIONS_ED_ALL_ED_FT
Diabetic Hyperglycemia    WHAT YOU NEED TO KNOW:    Diabetic hyperglycemia is a blood glucose (sugar) level that is higher than your diabetes care team provider recommends. You may have increased thirst and urinate more often than usual.     DISCHARGE INSTRUCTIONS:    Call 911 for any of the following:     You have a seizure.       You begin to breathe fast or are short of breath.       You become weak and confused.     Return to the emergency department if:     Your blood sugar level is over 240 mg/dL and you have ketones in your urine.      Your breath smells fruity.       You have nausea and are vomiting.       You have symptoms of dehydration, such as dark yellow urine, dry mouth and lips, and dry skin.     Call your care team provider if:     You continue to have higher blood sugar levels than your care team provider recommends.      You have questions or concerns about your condition or care.     Medicines:     Medicines, such as insulin and diabetes pills, decrease blood sugar levels.       Take your medicine as directed. Contact your healthcare provider if you think your medicine is not helping or if you have side effects. Tell him or her if you are allergic to any medicine. Keep a list of the medicines, vitamins, and herbs you take. Include the amounts, and when and why you take them. Bring the list or the pill bottles to follow-up visits. Carry your medicine list with you in case of an emergency.    Manage diabetic hyperglycemia:     If you take diabetes medicine or insulin, take it as directed. Missed or wrong doses can cause your blood sugar to go up.      Tell your care team provider if you continue to have trouble managing your blood sugar. He or she may change the type, amount, or timing of your diabetes medicine or insulin. If you do not take diabetes medicine or insulin, you may need to start.       Work with your care team provider to develop a sick day plan. Illness can cause your blood sugar to rise. A sick day plan helps you control your blood sugar level when you are sick.     Prevent diabetic hyperglycemia:     Check your blood sugar levels regularly. Ask your care team provider how often to check your blood sugar and what your levels should be.       Follow your meal plan. Your blood sugar can go up if you eat a large meal or you eat more carbohydrates than recommended. Work with a dietitian to develop a meal plan that is right for you.       Exercise as directed. Physical activity, such as exercise, can help lower your blood sugar when it is high. It can also keep your blood sugar levels steady over time. Be active for at least 30 minutes, 5 days a week. Include muscle strengthening activities 2 days each week. Do not sit for longer than 30 minutes at a time. Work with your care team provider to create an activity plan. Children should get at least 60 minutes of physical activity each day.       Check your ketones before exercise if your blood sugar level is above 240 mg/dL. Do not exercise if you have ketones in your urine because your blood sugar level may rise even more. Ask your healthcare provider how to lower your blood sugar when you have ketones.

## 2020-08-20 NOTE — H&P ADULT - ASSESSMENT
64M hx of dyslexia, HTN, HLD, T2IRDM s/p recent D/C 20 from [facility] for DKA and new onset rapid afib presenting with s/p frequent falls with dizziness and hyperglycemia.     # T2IRDM with hyperglycemia.   cont insulin regimen with Lantus 30 U qhs and 10 U TID and adjust per FS. check HgA1c.   correctional insulin    # Dizziness/gait disturbance  CT head neg. consider additional imaging with MRI  Neuro consult.   PT eval     # hx of afib last admission   currently in sinus rhythm  currently had not started on A/C. consider risks/benefits given frequent falls.     #DVT/GI proph          CAPRINI SCORE [CLOT]    AGE RELATED RISK FACTORS                                                       MOBILITY RELATED FACTORS  [ ] Age 41-60 years                                            (1 Point)                  [ ] Bed rest                                                        (1 Point)  [ ] Age: 61-74 years                                           (2 Points)                 [ ] Plaster cast                                                   (2 Points)  [3 ] Age= 75 years                                              (3 Points)                 [ ] Bed bound for more than 72 hours                 (2 Points)    DISEASE RELATED RISK FACTORS                                               GENDER SPECIFIC FACTORS  [ ] Edema in the lower extremities                       (1 Point)                  [ ] Pregnancy                                                     (1 Point)  [ ] Varicose veins                                               (1 Point)                  [ ] Post-partum < 6 weeks                                   (1 Point)             [ ] BMI > 25 Kg/m2                                            (1 Point)                  [ ] Hormonal therapy  or oral contraception          (1 Point)                 [ ] Sepsis (in the previous month)                        (1 Point)                  [ ] History of pregnancy complications                 (1 point)  [ ] Pneumonia or serious lung disease                                               [ ] Unexplained or recurrent                     (1 Point)           (in the previous month)                               (1 Point)  [ ] Abnormal pulmonary function test                     (1 Point)                 SURGERY RELATED RISK FACTORS  [ ] Acute myocardial infarction                              (1 Point)                 [ ]  Section                                             (1 Point)  [ ] Congestive heart failure (in the previous month)  (1 Point)               [ ] Minor surgery                                                  (1 Point)   [ ] Inflammatory bowel disease                             (1 Point)                 [ ] Arthroscopic surgery                                        (2 Points)  [ ] Central venous access                                      (2 Points)                [ ] General surgery lasting more than 45 minutes   (2 Points)       [ ] Stroke (in the previous month)                          (5 Points)               [ ] Elective arthroplasty                                         (5 Points)                                                                                                                                               HEMATOLOGY RELATED FACTORS                                                 TRAUMA RELATED RISK FACTORS  [ ] Prior episodes of VTE                                     (3 Points)                [ ] Fracture of the hip, pelvis, or leg                       (5 Points)  [ ] Positive family history for VTE                         (3 Points)                 [ ] Acute spinal cord injury (in the previous month)  (5 Points)  [ ] Prothrombin 06389 A                                     (3 Points)                 [ ] Paralysis  (less than 1 month)                             (5 Points)  [ ] Factor V Leiden                                             (3 Points)                  [ ] Multiple Trauma within 1 month                        (5 Points)  [ ] Lupus anticoagulants                                     (3 Points)                                                           [ ] Anticardiolipin antibodies                               (3 Points)                                                       [ ] High homocysteine in the blood                      (3 Points)                                             [ ] Other congenital or acquired thrombophilia      (3 Points)                                                [ ] Heparin induced thrombocytopenia                  (3 Points)                                          Total Score [ 3         ]    Caprini Score 0 - 2:  Low Risk, No VTE Prophylaxis required for most patients, encourage ambulation  Caprini Score 3 - 6:  At Risk, pharmacologic VTE prophylaxis is indicated for most patients (in the absence of a contraindication)  Caprini Score Greater than or = 7:  High Risk, pharmacologic VTE prophylaxis is indicated for most patients (in the absence of a contraindication)

## 2020-08-20 NOTE — ED PROVIDER NOTE - PATIENT PORTAL LINK FT
You can access the FollowMyHealth Patient Portal offered by [facility] by registering at the following website: [email or web address]. By joining [facility]’s FollowMyHealth portal, you will also be able to view your health information using other applications (apps) compatible with our system.

## 2020-08-20 NOTE — ED PROVIDER NOTE - OBJECTIVE STATEMENT
Pt is 63 y/o male with PMhx of dyslexia, DM, HTN, HLD, a-fib on Eliquis here for eval Pt is 65 y/o male with PMhx of dyslexia, DM, HTN, HLD, a-fib on Eliquis here for eval of hyperglycemia. Pt went to see his PCP Dr Prince  for regular check up, was noted to have elevated FS in the office - " it was above 500". Pt is on Metformin 1000mg BID as well as Novolin N - 30units BID - both of which he states he has been taking but admits he "may forget as he dyslexia", not sure if pt is dietary compliant. Denies cp, sob, denies abd pain, n/v./d, denies fever/chills/recent infections, denies cough, recent travel or Covid exposure. Pt is 65 y/o male with PMhx of dyslexia, DM, HTN, HLD, a-fib on Eliquis here for eval of hyperglycemia. Pt went to see his PCP Dr Prince  for regular check up, was noted to have elevated FS in the office - " it was above 500". Pt is on Metformin 1000mg BID as well as Novolin N - 30units BID - both of which he states he has been taking but admits he "may forget as he dyslexia", not sure if pt is dietary compliant. Denies cp, sob, denies abd pain, n/v./d, denies fever/chills/recent infections, denies cough, recent travel or Covid exposure. As per pt he has sustained multiple falls over the period of last week, with the last one 3 days ago, all including head injury, admits that his gait has been unsteady. On Eliquis for a-fib but when pharmacy called to verify medications the writer was advised that pt hasn't picked Eliquis up for the past 2 months;

## 2020-08-20 NOTE — ED PROVIDER NOTE - PROGRESS NOTE DETAILS
MITCHELL Sanabria - Dr Prince called, voicemail full, unable to leave a message. Pt noted to be walking to the bathroom with very unsteady gait, close to falling while in opening the bathroom door. Will admit. MITCHELL Santoro strongly suggested but pt absolutely doesn't want to stay;   The pt is clinically sober, AA&Ox3, free from distracting injury.  Throughout our interactions in the ED today, the pt has demonstrated concrete thinking/reasoning, has maintained an orderly/reasonable conversation, appears to have intact insight/judgment/reason and therefore in our opinion has capacity to make decisions.  Given the pt’s presentation, we communicated our concern for unsteady gait/falls/uncontrolled dm in laymans terms.    The pt verbalized an understanding of our worries. We’ve told the patient that the ED evaluation is incomplete & many troublesome conditions haven’t been r/o. We have discussed the need for further ED w/u so we can get more information about his condition.  We have discussed the range of possible dx, potential testing & tx options.  We’ve made  numerous efforts to prevent the pt from leaving AMA.  Our discussions included the potential outcomes of leaving AMA, including worsening of their condition, becoming permanently disabled/in pain/critically ill, or death.  Despite these efforts, we were unable to convince the pt to stay.  The pt is refusing any  further care and is leaving against medical advice. We have attempted to offer tx/rx/guidance for any dangerous conditions which are most likely and/or dangerous.  We have answered all questions and have implored the pt to return ASAP to complete the w/u.  A staff member witnessed the patient consenting to AMA. MITCHELL Wilson: Dispo reversed to admission at this time. Pt wants to be admitted. d/w Dr. Burk.

## 2020-08-21 LAB
CULTURE RESULTS: SIGNIFICANT CHANGE UP
GLUCOSE BLDC GLUCOMTR-MCNC: 189 MG/DL — HIGH (ref 70–99)
GLUCOSE BLDC GLUCOMTR-MCNC: 237 MG/DL — HIGH (ref 70–99)
GLUCOSE BLDC GLUCOMTR-MCNC: 240 MG/DL — HIGH (ref 70–99)
GLUCOSE BLDC GLUCOMTR-MCNC: 242 MG/DL — HIGH (ref 70–99)
SARS-COV-2 RNA SPEC QL NAA+PROBE: SIGNIFICANT CHANGE UP
SPECIMEN SOURCE: SIGNIFICANT CHANGE UP

## 2020-08-21 PROCEDURE — 71045 X-RAY EXAM CHEST 1 VIEW: CPT | Mod: 26

## 2020-08-21 PROCEDURE — 99232 SBSQ HOSP IP/OBS MODERATE 35: CPT

## 2020-08-21 RX ORDER — METOPROLOL TARTRATE 50 MG
25 TABLET ORAL
Refills: 0 | Status: DISCONTINUED | OUTPATIENT
Start: 2020-08-21 | End: 2020-08-22

## 2020-08-21 RX ORDER — APIXABAN 2.5 MG/1
5 TABLET, FILM COATED ORAL EVERY 12 HOURS
Refills: 0 | Status: DISCONTINUED | OUTPATIENT
Start: 2020-08-21 | End: 2020-08-22

## 2020-08-21 RX ORDER — INSULIN GLARGINE 100 [IU]/ML
40 INJECTION, SOLUTION SUBCUTANEOUS AT BEDTIME
Refills: 0 | Status: DISCONTINUED | OUTPATIENT
Start: 2020-08-21 | End: 2020-08-22

## 2020-08-21 RX ADMIN — Medication 2: at 17:40

## 2020-08-21 RX ADMIN — APIXABAN 5 MILLIGRAM(S): 2.5 TABLET, FILM COATED ORAL at 18:10

## 2020-08-21 RX ADMIN — Medication 10 UNIT(S): at 17:40

## 2020-08-21 RX ADMIN — Medication 25 MILLIGRAM(S): at 18:10

## 2020-08-21 RX ADMIN — ATORVASTATIN CALCIUM 20 MILLIGRAM(S): 80 TABLET, FILM COATED ORAL at 21:14

## 2020-08-21 RX ADMIN — ENOXAPARIN SODIUM 40 MILLIGRAM(S): 100 INJECTION SUBCUTANEOUS at 12:59

## 2020-08-21 RX ADMIN — INSULIN GLARGINE 40 UNIT(S): 100 INJECTION, SOLUTION SUBCUTANEOUS at 21:14

## 2020-08-21 RX ADMIN — Medication 10 UNIT(S): at 09:07

## 2020-08-21 RX ADMIN — LISINOPRIL 20 MILLIGRAM(S): 2.5 TABLET ORAL at 05:40

## 2020-08-21 RX ADMIN — Medication 2: at 09:07

## 2020-08-21 RX ADMIN — Medication 10 UNIT(S): at 12:58

## 2020-08-21 RX ADMIN — Medication 2: at 12:58

## 2020-08-21 RX ADMIN — PANTOPRAZOLE SODIUM 40 MILLIGRAM(S): 20 TABLET, DELAYED RELEASE ORAL at 09:08

## 2020-08-21 NOTE — DIETITIAN INITIAL EVALUATION ADULT. - OTHER INFO
64M hx of dyslexia, HTN, HLD, T2IRDM s/p recent D/C 7/18/20 from [facility] for DKA and presents with dizziness and hyperglycemia. A1C 13.6 (7/15) and RD met with patient on that day to discuss diabetic education. Suspect non-compliance at home per her note on 7/15. Met with patient; he is disinterested in any diet education. He reports that he eats 2 times/day and sometimes will eat breakfast of buttered toast. Typical day includes hotdogs (3-4 with no bun), hamburger if he has someone to cook them, or bologna sandwich. He doesn’t like to cook, so he usually eats something easy that he can put together. Denies drinking juice, alcohol, or water. Likes whole milk as primary beverage. Reports checking his blood sugar twice a day. States when he has a “high” he just “doesn’t eat”. Will continue to encourage and reinforce compliance.

## 2020-08-21 NOTE — CONSULT NOTE ADULT - SUBJECTIVE AND OBJECTIVE BOX
Neurology consult    NICOLE PAVON64yMale     Patient is a 64y old  Male who presents with a chief complaint of s/p falls, hyperglycemia (21 Aug 2020 13:24)      HPI:  64M hx of dyslexia, HTN, HLD, T2IRDM s/p recent D/C 20 from [facility] for DKA and new onset rapid afib. Pt was D/C on Lopressor and Eliquis but never did fill the prescriptions because he did not have enough money after he paid for his sister's medications. He has been taking his old regiment of meds as listed on Med rec. He presented to PMD today after falling 3 nights in a row while he was working on his backyard. All episodes preceded with sxs of lightheadedness. No LOC but had hit his head on concrete each time. Denied any antecedent CP, SOB, palps, diaphoresis. No recent fevers, cough, chills, NVD. +polyuria and polydipsia. No falls in the past 2 days but went to PMD for evaluation today and noted FS in the 500s and sent pt to ED. In ED, s/p 6U IV insulin and now FS: 268. Pt relates he is mostly compliant with insulin but occ miss a dose at night. CT head neg. Pt noted to have unsteady gait and advised admission but initially signed out AMA and now agreed to admission. He relates his gait is usually unsteady and walks with his shopping cart as a makeshift walker. While walking in the hallway, pt noted to be using handrail - denied weakness but more sensation of dizziness. Pt lives alone. (20 Aug 2020 21:33)      REVIEW OF SYSTEMS:    Constitutional: No fever, chills, fatigue, weakness  Eyes: no eye pain, visual disturbances, or discharge  ENT:  No difficulty hearing, tinnitus, vertigo; No sinus or throat pain  Neck: No pain or stiffness  Respiratory: No cough, dyspnea, wheezing   Cardiovascular: No chest pain, palpitations,   Gastrointestinal: No abdominal or epigastric pain. No nausea, vomiting  No diarrhea or constipation.   Genitourinary: No dysuria, frequency, hematuria or incontinence  Neurological: See above  Psychiatric: No depression, anxiety, mood swings or difficulty sleeping  Musculoskeletal: No joint pain or swelling; No muscle, back or extremity pain  Skin: No itching, burning, rashes or lesions   Lymph Nodes: No enlarged glands  Endocrine: No heat or cold intolerance; No hair loss, No h/o diabetes or thyroid dysfunction  Allergy and Immunologic: No hives or eczema    MEDICATIONS    acetaminophen   Tablet .. 650 milliGRAM(s) Oral every 6 hours PRN  atorvastatin 20 milliGRAM(s) Oral at bedtime  dextrose 40% Gel 15 Gram(s) Oral once PRN  dextrose 5%. 1000 milliLiter(s) IV Continuous <Continuous>  dextrose 50% Injectable 12.5 Gram(s) IV Push once  dextrose 50% Injectable 25 Gram(s) IV Push once  dextrose 50% Injectable 25 Gram(s) IV Push once  enoxaparin Injectable 40 milliGRAM(s) SubCutaneous daily  glucagon  Injectable 1 milliGRAM(s) IntraMuscular once PRN  insulin glargine Injectable (LANTUS) 40 Unit(s) SubCutaneous at bedtime  insulin lispro (HumaLOG) corrective regimen sliding scale   SubCutaneous three times a day before meals  insulin lispro (HumaLOG) corrective regimen sliding scale   SubCutaneous at bedtime  insulin lispro Injectable (HumaLOG) 10 Unit(s) SubCutaneous before breakfast  insulin lispro Injectable (HumaLOG) 10 Unit(s) SubCutaneous before lunch  insulin lispro Injectable (HumaLOG) 10 Unit(s) SubCutaneous before dinner  lisinopril 20 milliGRAM(s) Oral daily  pantoprazole    Tablet 40 milliGRAM(s) Oral before breakfast      PMH: Shoulder pain, left  Obesity  Dyslipidemia  Hypertension  Type 2 diabetes mellitus       PSH: S/P knee surgery      Family history:   FAMILY HISTORY:  Family history of bone cancer  Family history of colon cancer (Sibling)      SOCIAL HISTORY:  No history of tobacco or alcohol use     Allergies    No Known Allergies    Intolerances        Height (cm): 193.04 ( @ 16:06)  Weight (kg): 113.4 ( @ 16:06)  BMI (kg/m2): 30.4 ( @ 16:06)    Vital Signs Last 24 Hrs  T(C): 36.7 (21 Aug 2020 05:42), Max: 36.7 (21 Aug 2020 05:42)  T(F): 98 (21 Aug 2020 05:42), Max: 98 (21 Aug 2020 05:42)  HR: 80 (21 Aug 2020 05:42) (80 - 105)  BP: 136/62 (21 Aug 2020 05:42) (136/62 - 170/85)  BP(mean): --  RR: 16 (21 Aug 2020 05:42) (16 - 18)  SpO2: 98% (21 Aug 2020 05:42) (95% - 98%)      On Neurological Examination:    Head: normocephalic Neck: supple no carotid bruits    Mental Status - Patient is alert, oriented not aphasic or dysarthric    Cranial Nerves - PERRL, EOMI, VFF, normal V through XII no nystagmus edentulous     Motor Exam :  No drift normal strength tone and no dysmetria    Sensory:  Reduced pin vibration in both feet     Reflexes:  symmetric hypo plantars downgoing    Gait -  not tested                                                          LABS:  CBC Full  -  ( 20 Aug 2020 16:35 )  WBC Count : 8.43 K/uL  RBC Count : 5.22 M/uL  Hemoglobin : 15.6 g/dL  Hematocrit : 44.8 %  Platelet Count - Automated : 237 K/uL  Mean Cell Volume : 85.8 fl  Mean Cell Hemoglobin : 29.9 pg  Mean Cell Hemoglobin Concentration : 34.8 gm/dL  Auto Neutrophil # : 6.28 K/uL  Auto Lymphocyte # : 1.21 K/uL  Auto Monocyte # : 0.62 K/uL  Auto Eosinophil # : 0.22 K/uL  Auto Basophil # : 0.04 K/uL  Auto Neutrophil % : 74.4 %  Auto Lymphocyte % : 14.4 %  Auto Monocyte % : 7.4 %  Auto Eosinophil % : 2.6 %  Auto Basophil % : 0.5 %    Urinalysis Basic - ( 20 Aug 2020 17:10 )    Color: Yellow / Appearance: Clear / S.015 / pH: x  Gluc: x / Ketone: Negative  / Bili: Negative / Urobili: Negative   Blood: x / Protein: Negative / Nitrite: Negative   Leuk Esterase: Negative / RBC: x / WBC x   Sq Epi: x / Non Sq Epi: x / Bacteria: x          132<L>  |  100  |  23  ----------------------------<  465<HH>  4.9   |  23  |  0.93    Ca    9.4      20 Aug 2020 16:35    TPro  6.9  /  Alb  3.2<L>  /  TBili  0.4  /  DBili  x   /  AST  22  /  ALT  47<H>  /  AlkPhos  147<H>  08-20    LIVER FUNCTIONS - ( 20 Aug 2020 16:35 )  Alb: 3.2 g/dL / Pro: 6.9 g/dL / ALK PHOS: 147 U/L / ALT: 47 U/L / AST: 22 U/L / GGT: x           Hemoglobin A1C:       PT/INR - ( 20 Aug 2020 16:35 )   PT: 11.6 sec;   INR: 0.96 ratio         PTT - ( 20 Aug 2020 16:35 )  PTT:28.6 sec          RADIOLOGY  CT: < from: CT Head No Cont (20 @ 16:35) >    EXAM:  CT BRAIN      PROCEDURE DATE:  2020        INTERPRETATION:  CLINICAL STATEMENT: Fall    TECHNIQUE: CT of the head was performed without IV contrast.  RAPID artificial intelligence was utilized for the preliminary evaluation of intracranial hemorrhage.    COMPARISON: CT head 7/15/2020    FINDINGS:  There is mild diffuse parenchymal volume loss more prominent cerebellum without significant change. This can be seen in chronic alcohol use or patient on medication such as Dilantin.. There are areas of low attenuation in the periventricular white matter likely related to mild chronic microvascular ischemic changes.    There is no acute intracranial hemorrhage, parenchymal mass, mass effect or midline shift. There is no acute territorial infarct. There is no hydrocephalus.    The cranium is intact. The visualized paranasal sinuses are well-aerated.    IMPRESSION:  No acute intracranial hemorrhage or acute territorial infarct.  If symptoms persist, follow-up MRI exam recommended.          < end of copied text >

## 2020-08-21 NOTE — CONSULT NOTE ADULT - SUBJECTIVE AND OBJECTIVE BOX
DIABETES  MANAGEMENT SERVICE INITIAL CONSULT     Information obtained from: patient, chart review    HPI: 65y/o male with PMHx significant for dyslexia, HTN, type 2 diabetes, HLD, s/p recently d/c 7/18/20 from [facility] for DKA and new onset rapid A-Fib, who presented to hospital s/p frequent falls with dizziness and hyperglycemia. Pt was D/C on Lopressor and Eliquis but never purchased medication because he did not have enough money after he paid for his sister's medications. He has been taking his old regimen of Medication. Pt was eval by is PMD 8/20 after falling 3 nights in a row while he was working on his backyard. All episodes preceded with symptoms of lightheadedness. No LOC but had hit his head on concrete each time. Denied any antecedent Chest pain, palpitations, SOB, diaphoresis. Positive polyuria and polydipsia. No falls in the past 2 days, found with BG in the 500s and sent pt to ED by PMD.    Diabetes History:  Mr. Beltran is known to the Diabetes service.  Patient with uncontrolled diabetes most recent A1c is 13.6%. According to  [address] patient has not picked up any medication for almost one year.  Patient with positive polyuria, polydipsia, denies polyphagia, c/o occasional vision changes. Patient reports no weight changes. Patient reports compliance to a unrestricted diet eat mostly hamburgers and often skips breakfast.  Recent admissions for hyperglycemia and DKA.  History of microvascular complications including  neuropathy. No known history of macrovascular complications.  OF NOTE: Patient has Medicare part D drug plan for prescriptions.    Steroid therapy: None     Last Ophthalmology & Podiatry visit:  none    Occupation: unemployed    Current Endocrine Meds:   atorvastatin 20 milliGRAM(s) Oral at bedtime  dextrose 40% Gel 15 Gram(s) Oral once PRN  dextrose 50% Injectable 12.5 Gram(s) IV Push once  dextrose 50% Injectable 25 Gram(s) IV Push once  dextrose 50% Injectable 25 Gram(s) IV Push once  glucagon  Injectable 1 milliGRAM(s) IntraMuscular once PRN  insulin glargine Injectable (LANTUS) 40 Unit(s) SubCutaneous at bedtime  insulin lispro (HumaLOG) corrective regimen sliding scale   SubCutaneous three times a day before meals  insulin lispro (HumaLOG) corrective regimen sliding scale   SubCutaneous at bedtime  insulin lispro Injectable (HumaLOG) 10 Unit(s) SubCutaneous before breakfast  insulin lispro Injectable (HumaLOG) 10 Unit(s) SubCutaneous before lunch  insulin lispro Injectable (HumaLOG) 10 Unit(s) SubCutaneous before dinner      Home Medications:  Lipitor 20 mg oral tablet: 1 tab(s) orally once a day (20 Aug 2020 21:31)  ramipril 5 mg oral capsule: 1 cap(s) orally once a day (20 Aug 2020 21:31)      History of Medication Compliance:  No      Current (Non-Endocrine) Meds:  acetaminophen   Tablet .. 650 milliGRAM(s) Oral every 6 hours PRN  dextrose 5%. 1000 milliLiter(s) IV Continuous <Continuous>  enoxaparin Injectable 40 milliGRAM(s) SubCutaneous daily  lisinopril 20 milliGRAM(s) Oral daily  pantoprazole    Tablet 40 milliGRAM(s) Oral before breakfast      PAST MEDICAL & SURGICAL HISTORY:  Shoulder pain, left  Dyslipidemia  Hypertension  Type 2 diabetes mellitus  S/P knee surgery: left, 30 years ago-left    FAMILY HISTORY:  Family history of bone cancer  Family history of colon cancer (Sibling)      Allergies:  No Known Allergies      Height, weight BMI  Height (cm): 193.04 (08-20 @ 16:06)  Weight (kg): 113.4 (08-20 @ 16:06)  BMI (kg/m2): 30.4 (08-20 @ 16:06)    Vital Signs Last 24 Hrs  T(C): 36.7 (21 Aug 2020 05:42), Max: 36.7 (21 Aug 2020 05:42)  T(F): 98 (21 Aug 2020 05:42), Max: 98 (21 Aug 2020 05:42)  HR: 80 (21 Aug 2020 05:42) (80 - 105)  BP: 136/62 (21 Aug 2020 05:42) (136/62 - 170/85)  RR: 16 (21 Aug 2020 05:42) (16 - 18)  SpO2: 98% (21 Aug 2020 05:42) (95% - 98%)    LABS:                        15.6   8.43  )-----------( 237      ( 20 Aug 2020 16:35 )             44.8     08-20    132<L>  |  100  |  23  ----------------------------<  465<HH>  4.9   |  23  |  0.93    Ca    9.4      20 Aug 2020 16:35    TPro  6.9  /  Alb  3.2<L>  /  TBili  0.4  /  DBili  x   /  AST  22  /  ALT  47<H>  /  AlkPhos  147<H>  08-20    Thyroid Stimulating Hormone, Serum: 2.85 uIU/mL (07-14-20 @ 22:34)    FINGERSTICKS:  POCT Blood Glucose.: 237 mg/dL (08-21-20 @ 12:57)  POCT Blood Glucose.: 240 mg/dL (08-21-20 @ 09:01)  POCT Blood Glucose.: 248 mg/dL (08-20-20 @ 22:44)  POCT Blood Glucose.: 268 mg/dL (08-20-20 @ 20:28)  POCT Blood Glucose.: 288 mg/dL (08-20-20 @ 18:37)  POCT Blood Glucose.: 391 mg/dL (08-20-20 @ 17:35)  POCT Blood Glucose.: 432 mg/dL (08-20-20 @ 16:11)      PHYSICAL EXAM:  General: Lying in bed  Neuro:  Alert and answer question appropriately.   Ext:   pedal pulses palpable b/l  Feet:  decrease sensation  in feet      ASSESSMENT AND PLAN:      1.	 Severe exacerbation of underlying uncontrolled type 2 diabetes in this patient admitted s/p frequent falls with dizziness and severe hyperglycemia.  2.	Last A1C  13.6%  3.	Hyperglycemia like due to insufficient insulin dosing at home Per patient " I don't work and cannot afford to purchase my insulin"  4.	Last admission pt was d/c home on Humulin N 30 units BID and Metformin 1,000 mg BID  5.	NP called [facility] [phone] and patient has not picked up ANY medication for almost one year.   6.	OF NOTE: Patient has Medicare part D drug plan for prescriptions.  7.	For optimal glycemic control patient would benefit from increase to insulin regimen  8.	Complications of diabetes include: neuropathy  9.	Glycemic goals are to maintain blood sugars between  100 mg/dL and  180 mg/dL while admitted.      Plan:    1.	Increase basal insulin: glargine 40 units bedtime  2.	Increase  nutritional insulin: Humalog 12 units TID AC, hold if patient is NPO or not eating  3.	Continue correction insulin: Humalog 1 unit for every 50 mg/dL >151 mg/dL given regardless of PO intake to correct for hyperglycemia.   4.	Continue fingersticks: AC, HS and with any symptoms of hypoglycemia  5.	Please keep our service informed of any relevant clinical or nutritional changes as insulin therapy poses a high risk for hypoglycemia.        Patient Education:  Educated on impact of current A1C on long and short term complications of Diabetes. Insulin administration done with patient dialed up with 25 units without cue from NP. The importance of fingerstick monitoring and correct storage of insulin reinforced. Patient keeps all his insulin including pen is currently using in refrigerator. Signs/symptoms and treatment of Hypoglycemia (rule of 15). NP not confident patient understood all teaching points.     Patient encouraged to set alarms to remember his to take his bedtime insulin and metformin and to complete fingersticks.  RN to teach insulin self-administration and fingerstick with patient and document in CPG.     Discharge recommendations:  Metformin 1000 mg BID and basal insulin (at this time not sure if patient will be able to purchase states he has no money).     Patient to follow up at discharge with: Dr. Kraft (Endocrinologist) for further diabetes management    Care of this patient has been coordinated with primary team Dr. Wolf.   Thank you for allowing the Diabetes Management Services to participate in the care of this patient. DIABETES  MANAGEMENT SERVICE INITIAL CONSULT     Information obtained from: patient, chart review    HPI: 63y/o male with PMHx significant for dyslexia, HTN, type 2 diabetes, HLD, s/p recently d/c 7/18/20 from [facility] for DKA and new onset rapid A-Fib, who presented to hospital s/p frequent falls with dizziness and hyperglycemia. Pt was D/C on Lopressor and Eliquis but never purchased medication because he did not have enough money after he paid for his sister's medications. He has been taking his old regimen of Medication. Pt was eval by is PMD 8/20 after falling 3 nights in a row while he was working on his backyard. All episodes preceded with symptoms of lightheadedness. No LOC but had hit his head on concrete each time. Denied any antecedent Chest pain, palpitations, SOB, diaphoresis. Positive polyuria and polydipsia. No falls in the past 2 days, found with BG in the 500s and sent pt to ED by PMD.    Diabetes History:  Mr. Beltran is known to the Diabetes service.  Patient with uncontrolled diabetes most recent A1c is 13.6%. According to  [address] patient has not picked up any medication for almost one year.  Patient with positive polyuria, polydipsia, denies polyphagia, c/o occasional vision changes. Patient reports no weight changes. Patient reports compliance to a unrestricted diet eat mostly hamburgers and often skips breakfast.  Recent admissions for hyperglycemia and DKA.  History of microvascular complications including  neuropathy. No known history of macrovascular complications.  OF NOTE: Patient has Medicare part D drug plan for prescriptions.    Steroid therapy: None     Last Ophthalmology & Podiatry visit:  none    Occupation: unemployed    Current Endocrine Meds:   atorvastatin 20 milliGRAM(s) Oral at bedtime  dextrose 40% Gel 15 Gram(s) Oral once PRN  dextrose 50% Injectable 12.5 Gram(s) IV Push once  dextrose 50% Injectable 25 Gram(s) IV Push once  dextrose 50% Injectable 25 Gram(s) IV Push once  glucagon  Injectable 1 milliGRAM(s) IntraMuscular once PRN  insulin glargine Injectable (LANTUS) 40 Unit(s) SubCutaneous at bedtime  insulin lispro (HumaLOG) corrective regimen sliding scale   SubCutaneous three times a day before meals  insulin lispro (HumaLOG) corrective regimen sliding scale   SubCutaneous at bedtime  insulin lispro Injectable (HumaLOG) 10 Unit(s) SubCutaneous before breakfast  insulin lispro Injectable (HumaLOG) 10 Unit(s) SubCutaneous before lunch  insulin lispro Injectable (HumaLOG) 10 Unit(s) SubCutaneous before dinner      Home Medications:  Lipitor 20 mg oral tablet: 1 tab(s) orally once a day (20 Aug 2020 21:31)  ramipril 5 mg oral capsule: 1 cap(s) orally once a day (20 Aug 2020 21:31)      History of Medication Compliance:  No      Current (Non-Endocrine) Meds:  acetaminophen   Tablet .. 650 milliGRAM(s) Oral every 6 hours PRN  dextrose 5%. 1000 milliLiter(s) IV Continuous <Continuous>  enoxaparin Injectable 40 milliGRAM(s) SubCutaneous daily  lisinopril 20 milliGRAM(s) Oral daily  pantoprazole    Tablet 40 milliGRAM(s) Oral before breakfast      PAST MEDICAL & SURGICAL HISTORY:  Shoulder pain, left  Dyslipidemia  Hypertension  Type 2 diabetes mellitus  S/P knee surgery: left, 30 years ago-left    FAMILY HISTORY:  Family history of bone cancer  Family history of colon cancer (Sibling)      Allergies:  No Known Allergies      Height, weight BMI  Height (cm): 193.04 (08-20 @ 16:06)  Weight (kg): 113.4 (08-20 @ 16:06)  BMI (kg/m2): 30.4 (08-20 @ 16:06)    Vital Signs Last 24 Hrs  T(C): 36.7 (21 Aug 2020 05:42), Max: 36.7 (21 Aug 2020 05:42)  T(F): 98 (21 Aug 2020 05:42), Max: 98 (21 Aug 2020 05:42)  HR: 80 (21 Aug 2020 05:42) (80 - 105)  BP: 136/62 (21 Aug 2020 05:42) (136/62 - 170/85)  RR: 16 (21 Aug 2020 05:42) (16 - 18)  SpO2: 98% (21 Aug 2020 05:42) (95% - 98%)    LABS:                        15.6   8.43  )-----------( 237      ( 20 Aug 2020 16:35 )             44.8     08-20    132<L>  |  100  |  23  ----------------------------<  465<HH>  4.9   |  23  |  0.93    Ca    9.4      20 Aug 2020 16:35    TPro  6.9  /  Alb  3.2<L>  /  TBili  0.4  /  DBili  x   /  AST  22  /  ALT  47<H>  /  AlkPhos  147<H>  08-20    Thyroid Stimulating Hormone, Serum: 2.85 uIU/mL (07-14-20 @ 22:34)    FINGERSTICKS:  POCT Blood Glucose.: 237 mg/dL (08-21-20 @ 12:57)  POCT Blood Glucose.: 240 mg/dL (08-21-20 @ 09:01)  POCT Blood Glucose.: 248 mg/dL (08-20-20 @ 22:44)  POCT Blood Glucose.: 268 mg/dL (08-20-20 @ 20:28)  POCT Blood Glucose.: 288 mg/dL (08-20-20 @ 18:37)  POCT Blood Glucose.: 391 mg/dL (08-20-20 @ 17:35)  POCT Blood Glucose.: 432 mg/dL (08-20-20 @ 16:11)      PHYSICAL EXAM:  General: Lying in bed  Neuro:  Alert and answer question appropriately.   Ext:   pedal pulses palpable b/l  Feet:  decrease sensation  in feet      ASSESSMENT AND PLAN:      1.	 Severe exacerbation of underlying uncontrolled type 2 diabetes in this patient admitted s/p frequent falls with dizziness and severe hyperglycemia.  2.	Last A1C  13.6%  3.	Hyperglycemia like due to insufficient insulin dosing at home Per patient " I don't work and cannot afford to purchase my insulin"  4.	Last admission pt was d/c home on Humulin N 30 units BID and Metformin 1,000 mg BID  5.	NP called [facility] [phone] and patient has not picked up ANY medication for almost one year.   6.	OF NOTE: Patient has Medicare part D drug plan for prescriptions.  7.	For optimal glycemic control patient would benefit from increase to insulin regimen  8.	Complications of diabetes include: neuropathy  9.	Glycemic goals are to maintain blood sugars between  100 mg/dL and  180 mg/dL while admitted.      Plan:    1.	Increase basal insulin: glargine 40 units bedtime  2.	Increase  nutritional insulin: Humalog 12 units TID AC, hold if patient is NPO or not eating  3.	Continue correction insulin: Humalog 1 unit for every 50 mg/dL >151 mg/dL given regardless of PO intake to correct for hyperglycemia.   4.	Continue fingersticks: AC, HS and with any symptoms of hypoglycemia  5.	Please keep our service informed of any relevant clinical or nutritional changes as insulin therapy poses a high risk for hypoglycemia.        Patient Education:  Educated on impact of current A1C on long and short term complications of Diabetes. Insulin administration done with patient dialed up with 25 units without cue from NP. The importance of fingerstick monitoring and correct storage of insulin reinforced. Patient keeps all his insulin including pen is currently using in refrigerator. Signs/symptoms and treatment of Hypoglycemia (rule of 15). NP not confident patient understood all teaching points.     Patient encouraged to set alarms to remember his to take his bedtime insulin and metformin and to complete fingersticks.  RN to teach insulin self-administration and fingerstick with patient and document in CPG.     Discharge recommendations:  Metformin 1000 mg BID and basal insulin 30 units BID (at this time not sure if patient will be able to purchase states he has no money).     Patient to follow up at discharge with: Dr. Kraft (Endocrinologist) for further diabetes management    Care of this patient has been coordinated with primary team Dr. Wolf.   Thank you for allowing the Diabetes Management Services to participate in the care of this patient.

## 2020-08-21 NOTE — PHYSICAL THERAPY INITIAL EVALUATION ADULT - ADDITIONAL COMMENTS
Patient lives alone in a house with 5 NELLIE with handrails. Patient was independent with transfers and ambulation, used RW and shopping cart for mobility tasks.

## 2020-08-21 NOTE — PROGRESS NOTE ADULT - SUBJECTIVE AND OBJECTIVE BOX
HPI:  64M hx of dyslexia, HTN, HLD, T2IRDM s/p recent D/C 20 from [facility] for DKA and new onset rapid afib. Pt was D/C on Lopressor and Eliquis but never did fill the prescriptions because he did not have enough money after he paid for his sister's medications. He has been taking his old regiment of meds as listed on Med rec. He presented to PMD today after falling 3 nights in a row while he was working on his backyard. All episodes preceded with sxs of lightheadedness. No LOC but had hit his head on concrete each time. Denied any antecedent CP, SOB, palps, diaphoresis. No recent fevers, cough, chills, NVD. +polyuria and polydipsia. No falls in the past 2 days but went to PMD for evaluation today and noted FS in the 500s and sent pt to ED. In ED, s/p 6U IV insulin and now FS: 268. Pt relates he is mostly compliant with insulin but occ miss a dose at night. CT head neg. Pt noted to have unsteady gait and advised admission but initially signed out AMA and now agreed to admission. He relates his gait is usually unsteady and walks with his shopping cart as a makeshift walker. While walking in the hallway, pt noted to be using handrail - denied weakness but more sensation of dizziness. Pt lives alone. (20 Aug 2020 21:33)      Subjective  No further dizziness.      PAST MEDICAL & SURGICAL HISTORY:  Shoulder pain, left  Dyslipidemia  Hypertension  Type 2 diabetes mellitus  S/P knee surgery: left, 30 years ago-left      MedsMEDICATIONS  (STANDING):  atorvastatin 20 milliGRAM(s) Oral at bedtime  dextrose 5%. 1000 milliLiter(s) (50 mL/Hr) IV Continuous <Continuous>  dextrose 50% Injectable 12.5 Gram(s) IV Push once  dextrose 50% Injectable 25 Gram(s) IV Push once  dextrose 50% Injectable 25 Gram(s) IV Push once  enoxaparin Injectable 40 milliGRAM(s) SubCutaneous daily  insulin glargine Injectable (LANTUS) 30 Unit(s) SubCutaneous at bedtime  insulin lispro (HumaLOG) corrective regimen sliding scale   SubCutaneous three times a day before meals  insulin lispro (HumaLOG) corrective regimen sliding scale   SubCutaneous at bedtime  insulin lispro Injectable (HumaLOG) 10 Unit(s) SubCutaneous before breakfast  insulin lispro Injectable (HumaLOG) 10 Unit(s) SubCutaneous before lunch  insulin lispro Injectable (HumaLOG) 10 Unit(s) SubCutaneous before dinner  lisinopril 20 milliGRAM(s) Oral daily  pantoprazole    Tablet 40 milliGRAM(s) Oral before breakfast    MEDICATIONS  (PRN):  acetaminophen   Tablet .. 650 milliGRAM(s) Oral every 6 hours PRN Temp greater or equal to 38C (100.4F), Mild Pain (1 - 3)  dextrose 40% Gel 15 Gram(s) Oral once PRN Blood Glucose LESS THAN 70 milliGRAM(s)/deciliter  glucagon  Injectable 1 milliGRAM(s) IntraMuscular once PRN Glucose LESS THAN 70 milligrams/deciliter      Vital Signs Last 24 Hrs  T(C): 36.7 (21 Aug 2020 05:42), Max: 36.7 (21 Aug 2020 05:42)  T(F): 98 (21 Aug 2020 05:42), Max: 98 (21 Aug 2020 05:42)  HR: 80 (21 Aug 2020 05:42) (80 - 105)  BP: 136/62 (21 Aug 2020 05:42) (136/62 - 170/85)  BP(mean): --  RR: 16 (21 Aug 2020 05:42) (16 - 18)  SpO2: 98% (21 Aug 2020 05:42) (95% - 98%)  I&O's Summary    21 Aug 2020 07:01  -  21 Aug 2020 13:24  --------------------------------------------------------  IN: 400 mL / OUT: 0 mL / NET: 400 mL        PHYSICAL EXAM:  GENERAL: NAD  NECK: Supple  NERVOUS SYSTEM:  awake and alert  HEART: S1s2 NL , RRR  CHEST/LUNG: Clear to percussion bilaterally  ABDOMEN: Soft, Nontender, Nondistended; Bowel sounds present  EXTREMITIES:  No edema      LABS:( @ 16:35)                      15.6  8.43 )-----------( 237                 44.8    Neutrophils = 6.28 (74.4%)  Lymphocytes = 1.21 (14.4%)  Eosinophils = 0.22 (2.6%)  Basophils = 0.04 (0.5%)  Monocytes = 0.62 (7.4%)  Bands = --%        132<L>  |  100  |  23  ----------------------------<  465<HH>  4.9   |  23  |  0.93    Ca    9.4      20 Aug 2020 16:35    TPro  6.9  /  Alb  3.2<L>  /  TBili  0.4  /  DBili  x   /  AST  22  /  ALT  47<H>  /  AlkPhos  147<H>      ( 20 Aug 2020 16:35 )   PT: 11.6 sec;   INR: 0.96 ratio;       PTT:28.6 sec      RVP:    Venous Blood Gas:   @ 16:35  7.38/30/<25/--/74  VBG Lactate: --        Tox:         Urinalysis Basic - ( 20 Aug 2020 17:10 )    Color: Yellow / Appearance: Clear / S.015 / pH: x  Gluc: x / Ketone: Negative  / Bili: Negative / Urobili: Negative   Blood: x / Protein: Negative / Nitrite: Negative   Leuk Esterase: Negative / RBC: x / WBC x   Sq Epi: x / Non Sq Epi: x / Bacteria: x      CAPILLARY BLOOD GLUCOSE      POCT Blood Glucose.: 237 mg/dL (21 Aug 2020 12:57)  POCT Blood Glucose.: 240 mg/dL (21 Aug 2020 09:01)  POCT Blood Glucose.: 248 mg/dL (20 Aug 2020 22:44)  POCT Blood Glucose.: 268 mg/dL (20 Aug 2020 20:28)  POCT Blood Glucose.: 288 mg/dL (20 Aug 2020 18:37)  POCT Blood Glucose.: 391 mg/dL (20 Aug 2020 17:35)  POCT Blood Glucose.: 432 mg/dL (20 Aug 2020 16:11)      Imaging Personally Reviewed:  [ ] YES  [ ] NO        Care Discussed with Consultants/Other Providers [ x] YES  [ ] NO    64M hx of dyslexia, HTN, HLD, T2IRDM s/p recent D/C 20 from [facility] for DKA and new onset rapid afib presenting with s/p frequent falls with dizziness and hyperglycemia.     T2IRDM with hyperglycemia.   A1c  Diabetes consult  Inc Lantus to 40.  Cont humalog premeal/SS    Dizziness  CTH neg  PT eval  Neurology consulted by admitting team    PAF  resume ELiquis and Metoprolol(rx'd last admission but could not get filled due to no money after getting his sis meds)

## 2020-08-21 NOTE — DIETITIAN INITIAL EVALUATION ADULT. - PERTINENT MEDS FT
MEDICATIONS  (STANDING):  atorvastatin 20 milliGRAM(s) Oral at bedtime  dextrose 5%. 1000 milliLiter(s) (50 mL/Hr) IV Continuous <Continuous>  dextrose 50% Injectable 12.5 Gram(s) IV Push once  dextrose 50% Injectable 25 Gram(s) IV Push once  dextrose 50% Injectable 25 Gram(s) IV Push once  enoxaparin Injectable 40 milliGRAM(s) SubCutaneous daily  insulin glargine Injectable (LANTUS) 30 Unit(s) SubCutaneous at bedtime  insulin lispro (HumaLOG) corrective regimen sliding scale   SubCutaneous three times a day before meals  insulin lispro (HumaLOG) corrective regimen sliding scale   SubCutaneous at bedtime  insulin lispro Injectable (HumaLOG) 10 Unit(s) SubCutaneous before breakfast  insulin lispro Injectable (HumaLOG) 10 Unit(s) SubCutaneous before lunch  insulin lispro Injectable (HumaLOG) 10 Unit(s) SubCutaneous before dinner  lisinopril 20 milliGRAM(s) Oral daily  pantoprazole    Tablet 40 milliGRAM(s) Oral before breakfast    MEDICATIONS  (PRN):  acetaminophen   Tablet .. 650 milliGRAM(s) Oral every 6 hours PRN Temp greater or equal to 38C (100.4F), Mild Pain (1 - 3)  dextrose 40% Gel 15 Gram(s) Oral once PRN Blood Glucose LESS THAN 70 milliGRAM(s)/deciliter  glucagon  Injectable 1 milliGRAM(s) IntraMuscular once PRN Glucose LESS THAN 70 milligrams/deciliter

## 2020-08-21 NOTE — PHYSICAL THERAPY INITIAL EVALUATION ADULT - PERTINENT HX OF CURRENT PROBLEM, REHAB EVAL
64M hx of dyslexia, HTN, HLD, T2IRDM s/p recent D/C 7/18/20 from [facility] for DKA and new onset rapid afib presenting with s/p frequent falls with dizziness and hyperglycemia.

## 2020-08-21 NOTE — CONSULT NOTE ADULT - ASSESSMENT
63 yo male with DM HTN HLD s/p 7/18/20 admission for DKA and A Fib.  Didn't take Eliquis and Lopressor as prescribed.  He presents with 3 days of dizziness unsteady gait and falls with head injury.  CT head negative for acute findings.  R/O posterior circulation ischemia either thrombotic or embolic though history and neuro exam are non diagnostic.  R/O metabolic etiology.  R/O diabetic peripheral neuropathy with dysautonomia.     REC:  diabetic control, ASA statin BP control and check BP for orthostatic change, carotid sonogram, lipid profile, physiotherapy for gait and balance, consider rehab stay.

## 2020-08-22 ENCOUNTER — TRANSCRIPTION ENCOUNTER (OUTPATIENT)
Age: 65
End: 2020-08-22

## 2020-08-22 VITALS
OXYGEN SATURATION: 95 % | DIASTOLIC BLOOD PRESSURE: 81 MMHG | HEART RATE: 83 BPM | RESPIRATION RATE: 16 BRPM | SYSTOLIC BLOOD PRESSURE: 140 MMHG | TEMPERATURE: 98 F

## 2020-08-22 LAB
A1C WITH ESTIMATED AVERAGE GLUCOSE RESULT: 13.3 % — HIGH (ref 4–5.6)
ANION GAP SERPL CALC-SCNC: 9 MMOL/L — SIGNIFICANT CHANGE UP (ref 5–17)
BUN SERPL-MCNC: 11 MG/DL — SIGNIFICANT CHANGE UP (ref 7–23)
CALCIUM SERPL-MCNC: 8.2 MG/DL — LOW (ref 8.4–10.5)
CHLORIDE SERPL-SCNC: 106 MMOL/L — SIGNIFICANT CHANGE UP (ref 96–108)
CO2 SERPL-SCNC: 24 MMOL/L — SIGNIFICANT CHANGE UP (ref 22–31)
CREAT SERPL-MCNC: 0.81 MG/DL — SIGNIFICANT CHANGE UP (ref 0.5–1.3)
ESTIMATED AVERAGE GLUCOSE: 335 MG/DL — HIGH (ref 68–114)
GLUCOSE BLDC GLUCOMTR-MCNC: 190 MG/DL — HIGH (ref 70–99)
GLUCOSE BLDC GLUCOMTR-MCNC: 252 MG/DL — HIGH (ref 70–99)
GLUCOSE BLDC GLUCOMTR-MCNC: 253 MG/DL — HIGH (ref 70–99)
GLUCOSE SERPL-MCNC: 225 MG/DL — HIGH (ref 70–99)
HCT VFR BLD CALC: 41.7 % — SIGNIFICANT CHANGE UP (ref 39–50)
HGB BLD-MCNC: 14.1 G/DL — SIGNIFICANT CHANGE UP (ref 13–17)
MCHC RBC-ENTMCNC: 29.6 PG — SIGNIFICANT CHANGE UP (ref 27–34)
MCHC RBC-ENTMCNC: 33.8 GM/DL — SIGNIFICANT CHANGE UP (ref 32–36)
MCV RBC AUTO: 87.6 FL — SIGNIFICANT CHANGE UP (ref 80–100)
NRBC # BLD: 0 /100 WBCS — SIGNIFICANT CHANGE UP (ref 0–0)
PLATELET # BLD AUTO: 200 K/UL — SIGNIFICANT CHANGE UP (ref 150–400)
POTASSIUM SERPL-MCNC: 4.2 MMOL/L — SIGNIFICANT CHANGE UP (ref 3.5–5.3)
POTASSIUM SERPL-SCNC: 4.2 MMOL/L — SIGNIFICANT CHANGE UP (ref 3.5–5.3)
RBC # BLD: 4.76 M/UL — SIGNIFICANT CHANGE UP (ref 4.2–5.8)
RBC # FLD: 12.4 % — SIGNIFICANT CHANGE UP (ref 10.3–14.5)
SARS-COV-2 IGG SERPL QL IA: NEGATIVE — SIGNIFICANT CHANGE UP
SARS-COV-2 IGM SERPL IA-ACNC: <0.1 INDEX — SIGNIFICANT CHANGE UP
SODIUM SERPL-SCNC: 139 MMOL/L — SIGNIFICANT CHANGE UP (ref 135–145)
WBC # BLD: 5.95 K/UL — SIGNIFICANT CHANGE UP (ref 3.8–10.5)
WBC # FLD AUTO: 5.95 K/UL — SIGNIFICANT CHANGE UP (ref 3.8–10.5)

## 2020-08-22 PROCEDURE — 70450 CT HEAD/BRAIN W/O DYE: CPT

## 2020-08-22 PROCEDURE — 36415 COLL VENOUS BLD VENIPUNCTURE: CPT

## 2020-08-22 PROCEDURE — 85610 PROTHROMBIN TIME: CPT

## 2020-08-22 PROCEDURE — 80048 BASIC METABOLIC PNL TOTAL CA: CPT

## 2020-08-22 PROCEDURE — 86769 SARS-COV-2 COVID-19 ANTIBODY: CPT

## 2020-08-22 PROCEDURE — 80053 COMPREHEN METABOLIC PANEL: CPT

## 2020-08-22 PROCEDURE — 82010 KETONE BODYS QUAN: CPT

## 2020-08-22 PROCEDURE — U0003: CPT

## 2020-08-22 PROCEDURE — 82962 GLUCOSE BLOOD TEST: CPT

## 2020-08-22 PROCEDURE — 87086 URINE CULTURE/COLONY COUNT: CPT

## 2020-08-22 PROCEDURE — 82803 BLOOD GASES ANY COMBINATION: CPT

## 2020-08-22 PROCEDURE — 83036 HEMOGLOBIN GLYCOSYLATED A1C: CPT

## 2020-08-22 PROCEDURE — 99239 HOSP IP/OBS DSCHRG MGMT >30: CPT

## 2020-08-22 PROCEDURE — 85027 COMPLETE CBC AUTOMATED: CPT

## 2020-08-22 PROCEDURE — 93880 EXTRACRANIAL BILAT STUDY: CPT

## 2020-08-22 PROCEDURE — 96374 THER/PROPH/DIAG INJ IV PUSH: CPT

## 2020-08-22 PROCEDURE — 93005 ELECTROCARDIOGRAM TRACING: CPT

## 2020-08-22 PROCEDURE — 71045 X-RAY EXAM CHEST 1 VIEW: CPT

## 2020-08-22 PROCEDURE — 93880 EXTRACRANIAL BILAT STUDY: CPT | Mod: 26

## 2020-08-22 PROCEDURE — 85730 THROMBOPLASTIN TIME PARTIAL: CPT

## 2020-08-22 PROCEDURE — 99285 EMERGENCY DEPT VISIT HI MDM: CPT | Mod: 25

## 2020-08-22 RX ORDER — METFORMIN HYDROCHLORIDE 850 MG/1
1 TABLET ORAL
Qty: 60 | Refills: 0
Start: 2020-08-22 | End: 2020-09-20

## 2020-08-22 RX ORDER — RAMIPRIL 5 MG
1 CAPSULE ORAL
Qty: 0 | Refills: 0 | DISCHARGE

## 2020-08-22 RX ORDER — LISINOPRIL 2.5 MG/1
1 TABLET ORAL
Qty: 0 | Refills: 0 | DISCHARGE
Start: 2020-08-22

## 2020-08-22 RX ORDER — HUMAN INSULIN 100 [IU]/ML
30 INJECTION, SUSPENSION SUBCUTANEOUS
Qty: 60 | Refills: 0
Start: 2020-08-22

## 2020-08-22 RX ORDER — APIXABAN 2.5 MG/1
1 TABLET, FILM COATED ORAL
Qty: 30 | Refills: 1
Start: 2020-08-22

## 2020-08-22 RX ORDER — INSULIN LISPRO 100/ML
12 VIAL (ML) SUBCUTANEOUS
Refills: 0 | Status: DISCONTINUED | OUTPATIENT
Start: 2020-08-22 | End: 2020-08-22

## 2020-08-22 RX ORDER — METOPROLOL TARTRATE 50 MG
1 TABLET ORAL
Qty: 0 | Refills: 0 | DISCHARGE
Start: 2020-08-22

## 2020-08-22 RX ORDER — APIXABAN 2.5 MG/1
1 TABLET, FILM COATED ORAL
Qty: 0 | Refills: 0 | DISCHARGE
Start: 2020-08-22

## 2020-08-22 RX ADMIN — Medication 3: at 08:25

## 2020-08-22 RX ADMIN — Medication 25 MILLIGRAM(S): at 16:47

## 2020-08-22 RX ADMIN — APIXABAN 5 MILLIGRAM(S): 2.5 TABLET, FILM COATED ORAL at 06:59

## 2020-08-22 RX ADMIN — PANTOPRAZOLE SODIUM 40 MILLIGRAM(S): 20 TABLET, DELAYED RELEASE ORAL at 06:59

## 2020-08-22 RX ADMIN — LISINOPRIL 20 MILLIGRAM(S): 2.5 TABLET ORAL at 06:59

## 2020-08-22 RX ADMIN — APIXABAN 5 MILLIGRAM(S): 2.5 TABLET, FILM COATED ORAL at 16:47

## 2020-08-22 RX ADMIN — Medication 25 MILLIGRAM(S): at 06:59

## 2020-08-22 RX ADMIN — Medication 12 UNIT(S): at 16:46

## 2020-08-22 RX ADMIN — Medication 1: at 16:46

## 2020-08-22 RX ADMIN — Medication 12 UNIT(S): at 11:45

## 2020-08-22 RX ADMIN — Medication 3: at 11:44

## 2020-08-22 NOTE — PROGRESS NOTE ADULT - SUBJECTIVE AND OBJECTIVE BOX
Patient is a 64y old  Male who presents with a chief complaint of s/p falls, hyperglycemia (21 Aug 2020 14:18)      Patient seen and examined at bedside. No acute overnight events. Denies fever, chills, chest pain, shortness of breath.     ALLERGIES:  No Known Allergies    MEDICATIONS  (STANDING):  apixaban 5 milliGRAM(s) Oral every 12 hours  atorvastatin 20 milliGRAM(s) Oral at bedtime  dextrose 5%. 1000 milliLiter(s) (50 mL/Hr) IV Continuous <Continuous>  dextrose 50% Injectable 12.5 Gram(s) IV Push once  dextrose 50% Injectable 25 Gram(s) IV Push once  dextrose 50% Injectable 25 Gram(s) IV Push once  insulin glargine Injectable (LANTUS) 40 Unit(s) SubCutaneous at bedtime  insulin lispro (HumaLOG) corrective regimen sliding scale   SubCutaneous three times a day before meals  insulin lispro (HumaLOG) corrective regimen sliding scale   SubCutaneous at bedtime  insulin lispro Injectable (HumaLOG) 12 Unit(s) SubCutaneous three times a day before meals  lisinopril 20 milliGRAM(s) Oral daily  metoprolol tartrate 25 milliGRAM(s) Oral two times a day  pantoprazole    Tablet 40 milliGRAM(s) Oral before breakfast    MEDICATIONS  (PRN):  acetaminophen   Tablet .. 650 milliGRAM(s) Oral every 6 hours PRN Temp greater or equal to 38C (100.4F), Mild Pain (1 - 3)  dextrose 40% Gel 15 Gram(s) Oral once PRN Blood Glucose LESS THAN 70 milliGRAM(s)/deciliter  glucagon  Injectable 1 milliGRAM(s) IntraMuscular once PRN Glucose LESS THAN 70 milligrams/deciliter    Vital Signs Last 24 Hrs  T(F): 97.6 (22 Aug 2020 06:02), Max: 98.1 (21 Aug 2020 20:49)  HR: 78 (22 Aug 2020 06:02) (78 - 95)  BP: 131/66 (22 Aug 2020 06:02) (131/66 - 146/85)  RR: 15 (22 Aug 2020 06:02) (15 - 15)  SpO2: 99% (22 Aug 2020 06:02) (96% - 99%)  I&O's Summary    21 Aug 2020 07:01  -  22 Aug 2020 07:00  --------------------------------------------------------  IN: 1240 mL / OUT: 401 mL / NET: 839 mL    BMI (kg/m2): 30.4 (20 @ 16:06)  PHYSICAL EXAM:  General: NAD, A/O x 3  ENT: MMM, no tonsilar exudate. EOMI. Poor dentition  Neck: Supple, No JVD  Lungs: Clear to auscultation bilaterally, no wheezes. Good air entry bilaterally   Cardio: RRR, S1/S2, No murmurs  Abdomen: Soft, Obese, Nontender, Nondistended; Bowel sounds present  Extremities: No calf tenderness, No pitting edema    LABS:                        14.1   5.95  )-----------( 200      ( 22 Aug 2020 06:56 )             41.7           139  |  106  |  11  ----------------------------<  225  4.2   |  24  |  0.81    Ca    8.2      22 Aug 2020 06:56    TPro  6.9  /  Alb  3.2  /  TBili  0.4  /  DBili  x   /  AST  22  /  ALT  47  /  AlkPhos  147       eGFR if Non African American: 95 mL/min/1.73M2 (20 @ 06:56)  eGFR if African American: 110 mL/min/1.73M2 (20 @ 06:56)    PT/INR - ( 20 Aug 2020 16:35 )   PT: 11.6 sec;   INR: 0.96 ratio       PTT - ( 20 Aug 2020 16:35 )  PTT:28.6 sec     -15 Chol 195 mg/dL  mg/dL HDL 50 mg/dL Trig 170 mg/dL    16:35 - VBG - pH: 7.38  | pCO2: 30    | pO2: <25   | Lactate:          POCT Blood Glucose.: 253 mg/dL (22 Aug 2020 08:10)  POCT Blood Glucose.: 189 mg/dL (21 Aug 2020 21:11)  POCT Blood Glucose.: 242 mg/dL (21 Aug 2020 17:37)  POCT Blood Glucose.: 237 mg/dL (21 Aug 2020 12:57)    Urinalysis Basic - ( 20 Aug 2020 17:10 )    Color: Yellow / Appearance: Clear / S.015 / pH: x  Gluc: x / Ketone: Negative  / Bili: Negative / Urobili: Negative   Blood: x / Protein: Negative / Nitrite: Negative   Leuk Esterase: Negative / RBC: x / WBC x   Sq Epi: x / Non Sq Epi: x / Bacteria: x    Culture - Urine (collected 20 Aug 2020 17:10)  Source: .Urine Clean Catch (Midstream)  Final Report (21 Aug 2020 17:31):    <10,000 CFU/mL Normal Urogenital Dolores    RADIOLOGY & ADDITIONAL TESTS: EKG, CXR    Care Discussed with Consultants/Other Providers: Yes

## 2020-08-22 NOTE — DISCHARGE NOTE PROVIDER - NSDCCPCAREPLAN_GEN_ALL_CORE_FT
PRINCIPAL DISCHARGE DIAGNOSIS  Diagnosis: Hyperglycemia  Assessment and Plan of Treatment:       SECONDARY DISCHARGE DIAGNOSES  Diagnosis: Hyperglycemia  Assessment and Plan of Treatment:

## 2020-08-22 NOTE — PROGRESS NOTE ADULT - ASSESSMENT
64M hx of dyslexia, HTN, HLD, T2IRDM s/p recent D/C 7/18/20 from [facility] for DKA and new onset rapid afib presenting with s/p frequent falls with dizziness and hyperglycemia.     Dizziness likely due to hyperglycemia. Uncontrolled Type 2 Diabetes Mellitus   - Diabetes service following  - Lantus 40u qhs, Lispro 12u TID AC with AGNIESZKA  - HbA1C 13.6%  - Pt nonadherent to medications. States he did not have money for medications. Upon review, patient has Part D Medicare  - Neurology recommendations appreciated  - Carotid duplex pending. CTH on admission negative. Pt denies recurrence of dizziness while hospitalized  - PT evaluated patient, recommended home PT  - C/w statin    Atrial Fibrillation  - C/w Eliquis 5mg BID  - C/w Lopressor 25mg BID    DVT/GI Prophylaxis:  Eliquis  Protonix

## 2020-08-22 NOTE — DISCHARGE NOTE PROVIDER - HOSPITAL COURSE
Hospital Course:    Per admitting physician:     HPI:    64M hx of dyslexia, HTN, HLD, T2IRDM s/p recent D/C 7/18/20 from [facility] for DKA and new onset rapid afib. Pt was D/C on Lopressor and Eliquis but never did fill the prescriptions because he did not have enough money after he paid for his sister's medications. He has been taking his old regiment of meds as listed on Med rec. He presented to PMD today after falling 3 nights in a row while he was working on his backyard. All episodes preceded with sxs of lightheadedness. No LOC but had hit his head on concrete each time. Denied any antecedent CP, SOB, palps, diaphoresis. No recent fevers, cough, chills, NVD. +polyuria and polydipsia. No falls in the past 2 days but went to PMD for evaluation today and noted FS in the 500s and sent pt to ED. In ED, s/p 6U IV insulin and now FS: 268. Pt relates he is mostly compliant with insulin but occ miss a dose at night. CT head neg. Pt noted to have unsteady gait and advised admission but initially signed out AMA and now agreed to admission. He relates his gait is usually unsteady and walks with his shopping cart as a makeshift walker. While walking in the hallway, pt noted to be using handrail - denied weakness but more sensation of dizziness. Pt lives alone. (20 Aug 2020 21:33)            Medical Floor Course:    NICOLE PAVON was admitted to medical floor under the impression of dizziness due to hyperglycemia. Pt was evaluated by Neurology and Diabetes service while hospitalized. Inpatient insulin was adjusted, patient counseled on medication adherence. Home medications were continued during hospitalization. He did not complain of any dizziness. Per Diabetes service, patient to be discharged with Metformin 1000mg BID and basal insulin 30u BID. Patient verbalizes understanding of DM plan and states he will  prescribed medications at time of discharge.         Discharging Provider:  Justin Quinn MD    Contact Info: Cell [phone] - Please call with any questions or concerns.        Outpatient Provider: Dr. Kraft (Endocrinology)        I, the attending physician, was physically present for the key portions of the evaluation and management (E/M) service provided. The total amount of time spent reviewing the hospital course, laboratory values, imaging findngs, assesing/counseling the patient, discussing with consultant physicians, social work, nursing staff was *** minutes.

## 2020-08-22 NOTE — DISCHARGE NOTE NURSING/CASE MANAGEMENT/SOCIAL WORK - PATIENT PORTAL LINK FT
You can access the FollowMyHealth Patient Portal offered by [facility] by registering at the following website: [email or web address]. By joining [employer]’s FollowMyHealth portal, you will also be able to view your health information using other applications (apps) compatible with our system.

## 2020-08-22 NOTE — DISCHARGE NOTE PROVIDER - NSDCMRMEDTOKEN_GEN_ALL_CORE_FT
alcohol swabs : Apply topically to affected area 4 times a day   apixaban 5 mg oral tablet: 1 tab(s) orally every 12 hours  apixaban 5 mg oral tablet: 1 tab(s) orally every 12 hours  glucometer (per patient&#x27;s insurance): Test blood sugars four times a day. Dispense #1 glucometer. for DM II  glucometer (per patient&#x27;s insurance): Test blood sugars four times a day. Dispense #1 glucometer.  Insulin Pen Needles, 4mm: 1 application subcutaneously 4 times a day. ** Use with insulin pen **  for DM II  Insulin Pen Needles, 4mm: 1 application subcutaneously 4 times a day. ** Use with insulin pen **   lancets: 1 application subcutaneously 4 times a day   Lipitor 20 mg oral tablet: 1 tab(s) orally once a day  lisinopril 20 mg oral tablet: 1 tab(s) orally once a day  metFORMIN 1000 mg oral tablet: 1 tab(s) orally 2 times a day for DM II  E11.9  metoprolol tartrate 25 mg oral tablet: 1 tab(s) orally 2 times a day  NovoLIN N 100 units/mL subcutaneous suspension: 30 unit(s) subcutaneous 2 times a day at breakfast and dinner  Dx E11.9  test strips (per patient&#x27;s insurance): 1 application subcutaneously 4 times a day. ** Compatible with patient&#x27;s glucometer **  test strips (per patient&#x27;s insurance): 1 application subcutaneously 4 times a day. ** Compatible with patient&#x27;s glucometer **

## 2020-08-30 ENCOUNTER — EMERGENCY (EMERGENCY)
Facility: HOSPITAL | Age: 65
LOS: 1 days | Discharge: HOME | End: 2020-08-30
Attending: EMERGENCY MEDICINE | Admitting: EMERGENCY MEDICINE
Payer: MEDICARE

## 2020-08-30 VITALS
HEIGHT: 76 IN | RESPIRATION RATE: 16 BRPM | OXYGEN SATURATION: 95 % | DIASTOLIC BLOOD PRESSURE: 89 MMHG | HEART RATE: 105 BPM | TEMPERATURE: 98 F | SYSTOLIC BLOOD PRESSURE: 168 MMHG | WEIGHT: 250 LBS

## 2020-08-30 VITALS
OXYGEN SATURATION: 95 % | SYSTOLIC BLOOD PRESSURE: 154 MMHG | RESPIRATION RATE: 18 BRPM | DIASTOLIC BLOOD PRESSURE: 84 MMHG | HEART RATE: 105 BPM

## 2020-08-30 DIAGNOSIS — M54.6 PAIN IN THORACIC SPINE: ICD-10-CM

## 2020-08-30 LAB
ACETONE SERPL-MCNC: ABNORMAL
ALBUMIN SERPL ELPH-MCNC: 3.4 G/DL — SIGNIFICANT CHANGE UP (ref 3.3–5)
ALP SERPL-CCNC: 180 U/L — HIGH (ref 40–120)
ALT FLD-CCNC: 36 U/L — SIGNIFICANT CHANGE UP (ref 10–45)
ANION GAP SERPL CALC-SCNC: 12 MMOL/L — SIGNIFICANT CHANGE UP (ref 5–17)
AST SERPL-CCNC: 12 U/L — SIGNIFICANT CHANGE UP (ref 10–40)
BASOPHILS # BLD AUTO: 0.03 K/UL — SIGNIFICANT CHANGE UP (ref 0–0.2)
BASOPHILS NFR BLD AUTO: 0.3 % — SIGNIFICANT CHANGE UP (ref 0–2)
BILIRUB SERPL-MCNC: 0.6 MG/DL — SIGNIFICANT CHANGE UP (ref 0.2–1.2)
BUN SERPL-MCNC: 33 MG/DL — HIGH (ref 7–23)
CALCIUM SERPL-MCNC: 9.6 MG/DL — SIGNIFICANT CHANGE UP (ref 8.4–10.5)
CHLORIDE SERPL-SCNC: 99 MMOL/L — SIGNIFICANT CHANGE UP (ref 96–108)
CO2 BLDV-SCNC: 24 MMOL/L — SIGNIFICANT CHANGE UP (ref 21–29)
CO2 SERPL-SCNC: 24 MMOL/L — SIGNIFICANT CHANGE UP (ref 22–31)
CREAT SERPL-MCNC: 0.86 MG/DL — SIGNIFICANT CHANGE UP (ref 0.5–1.3)
EOSINOPHIL # BLD AUTO: 0.14 K/UL — SIGNIFICANT CHANGE UP (ref 0–0.5)
EOSINOPHIL NFR BLD AUTO: 1.3 % — SIGNIFICANT CHANGE UP (ref 0–6)
GLUCOSE BLDC GLUCOMTR-MCNC: 380 MG/DL — HIGH (ref 70–99)
GLUCOSE BLDC GLUCOMTR-MCNC: 524 MG/DL — CRITICAL HIGH (ref 70–99)
GLUCOSE BLDC GLUCOMTR-MCNC: 548 MG/DL — CRITICAL HIGH (ref 70–99)
GLUCOSE SERPL-MCNC: 537 MG/DL — CRITICAL HIGH (ref 70–99)
HCT VFR BLD CALC: 43.3 % — SIGNIFICANT CHANGE UP (ref 39–50)
HGB BLD-MCNC: 15 G/DL — SIGNIFICANT CHANGE UP (ref 13–17)
IMM GRANULOCYTES NFR BLD AUTO: 0.5 % — SIGNIFICANT CHANGE UP (ref 0–1.5)
LYMPHOCYTES # BLD AUTO: 0.81 K/UL — LOW (ref 1–3.3)
LYMPHOCYTES # BLD AUTO: 7.4 % — LOW (ref 13–44)
MCHC RBC-ENTMCNC: 29.5 PG — SIGNIFICANT CHANGE UP (ref 27–34)
MCHC RBC-ENTMCNC: 34.6 GM/DL — SIGNIFICANT CHANGE UP (ref 32–36)
MCV RBC AUTO: 85.1 FL — SIGNIFICANT CHANGE UP (ref 80–100)
MONOCYTES # BLD AUTO: 0.69 K/UL — SIGNIFICANT CHANGE UP (ref 0–0.9)
MONOCYTES NFR BLD AUTO: 6.3 % — SIGNIFICANT CHANGE UP (ref 2–14)
NEUTROPHILS # BLD AUTO: 9.23 K/UL — HIGH (ref 1.8–7.4)
NEUTROPHILS NFR BLD AUTO: 84.2 % — HIGH (ref 43–77)
NRBC # BLD: 0 /100 WBCS — SIGNIFICANT CHANGE UP (ref 0–0)
PCO2 BLDV: 39 MMHG — SIGNIFICANT CHANGE UP (ref 35–50)
PH BLDV: 7.38 — SIGNIFICANT CHANGE UP (ref 7.35–7.45)
PLATELET # BLD AUTO: 213 K/UL — SIGNIFICANT CHANGE UP (ref 150–400)
PO2 BLDV: SIGNIFICANT CHANGE UP MMHG (ref 25–45)
POTASSIUM SERPL-MCNC: 4.9 MMOL/L — SIGNIFICANT CHANGE UP (ref 3.5–5.3)
POTASSIUM SERPL-SCNC: 4.9 MMOL/L — SIGNIFICANT CHANGE UP (ref 3.5–5.3)
PROT SERPL-MCNC: 7.6 G/DL — SIGNIFICANT CHANGE UP (ref 6–8.3)
RBC # BLD: 5.09 M/UL — SIGNIFICANT CHANGE UP (ref 4.2–5.8)
RBC # FLD: 12.2 % — SIGNIFICANT CHANGE UP (ref 10.3–14.5)
SAO2 % BLDV: 73 % — SIGNIFICANT CHANGE UP (ref 67–88)
SODIUM SERPL-SCNC: 135 MMOL/L — SIGNIFICANT CHANGE UP (ref 135–145)
WBC # BLD: 10.96 K/UL — HIGH (ref 3.8–10.5)
WBC # FLD AUTO: 10.96 K/UL — HIGH (ref 3.8–10.5)

## 2020-08-30 PROCEDURE — 99284 EMERGENCY DEPT VISIT MOD MDM: CPT

## 2020-08-30 PROCEDURE — 82962 GLUCOSE BLOOD TEST: CPT

## 2020-08-30 PROCEDURE — 82803 BLOOD GASES ANY COMBINATION: CPT

## 2020-08-30 PROCEDURE — 70450 CT HEAD/BRAIN W/O DYE: CPT

## 2020-08-30 PROCEDURE — 99284 EMERGENCY DEPT VISIT MOD MDM: CPT | Mod: 25

## 2020-08-30 PROCEDURE — 71250 CT THORAX DX C-: CPT | Mod: 26

## 2020-08-30 PROCEDURE — 85027 COMPLETE CBC AUTOMATED: CPT

## 2020-08-30 PROCEDURE — 36415 COLL VENOUS BLD VENIPUNCTURE: CPT

## 2020-08-30 PROCEDURE — 70450 CT HEAD/BRAIN W/O DYE: CPT | Mod: 26

## 2020-08-30 PROCEDURE — 80053 COMPREHEN METABOLIC PANEL: CPT

## 2020-08-30 PROCEDURE — 71250 CT THORAX DX C-: CPT

## 2020-08-30 PROCEDURE — 82009 KETONE BODYS QUAL: CPT

## 2020-08-30 PROCEDURE — 96360 HYDRATION IV INFUSION INIT: CPT

## 2020-08-30 RX ORDER — ACETAMINOPHEN 500 MG
650 TABLET ORAL ONCE
Refills: 0 | Status: COMPLETED | OUTPATIENT
Start: 2020-08-30 | End: 2020-08-30

## 2020-08-30 RX ORDER — SODIUM CHLORIDE 9 MG/ML
2000 INJECTION INTRAMUSCULAR; INTRAVENOUS; SUBCUTANEOUS ONCE
Refills: 0 | Status: COMPLETED | OUTPATIENT
Start: 2020-08-30 | End: 2020-08-30

## 2020-08-30 RX ORDER — INSULIN HUMAN 100 [IU]/ML
4 INJECTION, SOLUTION SUBCUTANEOUS ONCE
Refills: 0 | Status: COMPLETED | OUTPATIENT
Start: 2020-08-30 | End: 2020-08-30

## 2020-08-30 RX ORDER — RAMIPRIL 5 MG
0 CAPSULE ORAL
Qty: 0 | Refills: 0 | DISCHARGE

## 2020-08-30 RX ORDER — ATORVASTATIN CALCIUM 80 MG/1
1 TABLET, FILM COATED ORAL
Qty: 0 | Refills: 0 | DISCHARGE

## 2020-08-30 RX ADMIN — SODIUM CHLORIDE 2000 MILLILITER(S): 9 INJECTION INTRAMUSCULAR; INTRAVENOUS; SUBCUTANEOUS at 15:36

## 2020-08-30 RX ADMIN — SODIUM CHLORIDE 2000 MILLILITER(S): 9 INJECTION INTRAMUSCULAR; INTRAVENOUS; SUBCUTANEOUS at 17:00

## 2020-08-30 RX ADMIN — Medication 650 MILLIGRAM(S): at 15:15

## 2020-08-30 RX ADMIN — INSULIN HUMAN 4 UNIT(S): 100 INJECTION, SOLUTION SUBCUTANEOUS at 17:30

## 2020-08-30 RX ADMIN — Medication 650 MILLIGRAM(S): at 14:45

## 2020-08-30 NOTE — ED PROVIDER NOTE - CLINICAL SUMMARY MEDICAL DECISION MAKING FREE TEXT BOX
63 y/o M with PMH of dyslexia, HTN, HLD, T2IRDM, Afib on Eliquis, recently admitted for DKA and new onset Afib and dc'd few days ago, presents to the ED with c/o left upper back pain s/p mechanical trip and fall 3 days ago. Patient reports he tripped on a vine in the yard and fell backwards hitting his back against a chair. Pain is worse with laying down. He denies CP, SOB, symptoms prior to fall, head injury, LOC or all other complaints. Reports taking advil q3hrs with no relief. PE as noted above, 's. labs pending, imaging pending 63 y/o M with PMH of dyslexia, HTN, HLD, T2IRDM, Afib on Eliquis, recently admitted for DKA and new onset Afib and dc'd few days ago, presents to the ED with c/o left upper back pain s/p mechanical trip and fall 3 days ago. Patient reports he tripped on a vine in the yard and fell backwards hitting his back against a chair. Pain is worse with laying down. He denies CP, SOB, symptoms prior to fall, head injury, LOC or all other complaints. Reports taking advil q3hrs with no relief. PE as noted above, 's. labs reviewed, patient hydrated and given insulin, BS improved, no clinical signs of DKA. imaging results reviewed-- rib fractures noted, patient informed of this. Will dc with pain meds. Patient stable for dc

## 2020-08-30 NOTE — ED PROVIDER NOTE - PATIENT PORTAL LINK FT
You can access the FollowMyHealth Patient Portal offered by [facility] by registering at the following website: [email or web address]. By joining [employer]’s FollowMyHealth portal, you will also be able to view your health information using other applications (apps) compatible with our system.

## 2020-08-30 NOTE — ED PROVIDER NOTE - NSFOLLOWUPINSTRUCTIONS_ED_ALL_ED_FT
Follow up with your primary care physician within 2-3 days   Take the prescribed medication as directed for pain. Use the incentive Spirometry as directed   Stay hydrated  Return to the ER if your symptoms worsen or for any other medical emergencies  *******************  Hyperglycemia  Hyperglycemia is when the sugar (glucose) level in your blood is too high. It may not cause symptoms. If you do have symptoms, they may include warning signs, such as:  Feeling more thirsty than normal.Hunger.Feeling tired.Needing to pee (urinate) more than normal.Blurry eyesight (vision).You may get other symptoms as it gets worse, such as:  Dry mouth.Not being hungry (loss of appetite).Fruity-smelling breath.Weakness.Weight gain or loss that is not planned. Weight loss may be fast.A tingling or numb feeling in your hands or feet.Headache.Skin that does not bounce back quickly when it is lightly pinched and released (poor skin turgor).Pain in your belly (abdomen).Cuts or bruises that heal slowly.High blood sugar can happen to people who do or do not have diabetes. High blood sugar can happen slowly or quickly, and it can be an emergency.  Follow these instructions at home:  General instructions     Take over-the-counter and prescription medicines only as told by your doctor.Do not use products that contain nicotine or tobacco, such as cigarettes and e-cigarettes. If you need help quitting, ask your doctor.Limit alcohol intake to no more than 1 drink per day for nonpregnant women and 2 drinks per day for men. One drink equals 12 oz of beer, 5 oz of wine, or 1½ oz of hard liquor.Manage stress. If you need help with this, ask your doctor.Keep all follow-up visits as told by your doctor. This is important.Eating and drinking        Stay at a healthy weight.Exercise regularly, as told by your doctor.Drink enough fluid, especially when you:  Exercise.Get sick.Are in hot temperatures.Eat healthy foods, such as:  Low-fat (lean) proteins.Complex carbs (complex carbohydrates), such as whole wheat bread or brown rice.Fresh fruits and vegetables.Low-fat dairy products.Healthy fats.Drink enough fluid to keep your pee (urine) clear or pale yellow.If you have diabetes:     Make sure you know the symptoms of hyperglycemia.Follow your diabetes management plan, as told by your doctor. Make sure you:  Take insulin and medicines as told.Follow your exercise plan.Follow your meal plan. Eat on time. Do not skip meals.Check your blood sugar as often as told. Make sure to check before and after exercise. If you exercise longer or in a different way than you normally do, check your blood sugar more often.Follow your sick day plan whenever you cannot eat or drink normally. Make this plan ahead of time with your doctor.Share your diabetes management plan with people in your workplace, school, and household.Check your urine for ketones when you are ill and as told by your doctor.Carry a card or wear jewelry that says that you have diabetes.Contact a doctor if:  Your blood sugar level is higher than 240 mg/dL (13.3 mmol/L) for 2 days in a row.You have problems keeping your blood sugar in your target range.High blood sugar happens often for you.Get help right away if:  You have trouble breathing.You have a change in how you think, feel, or act (mental status).You feel sick to your stomach (nauseous), and that feeling does not go away.You cannot stop throwing up (vomiting).These symptoms may be an emergency. Do not wait to see if the symptoms will go away. Get medical help right away. Call your local emergency services (911 in the [place] Do not drive yourself to the hospital.   Summary  Hyperglycemia is when the sugar (glucose) level in your blood is too high.High blood sugar can happen to people who do or do not have diabetes.Make sure you drink enough fluids, eat healthy foods, and exercise regularly.Contact your doctor if you have problems keeping your blood sugar in your target range.This information is not intended to replace advice given to you by your health care provider. Make sure you discuss any questions you have with your health care provider.    ***************************  Rib Fracture     A rib fracture is a break or crack in one of the bones of the ribs. The ribs are like a cage that goes around your upper chest. A broken or cracked rib is often painful, but most do not cause other problems. Most rib fractures usually heal on their own in 1–3 months.  Follow these instructions at home:  Managing pain, stiffness, and swelling     If directed, apply ice to the injured area.  Put ice in a plastic bag.Place a towel between your skin and the bag.Leave the ice on for 20 minutes, 2–3 times a day.Take over-the-counter and prescription medicines only as told by your doctor.Activity     Avoid activities that cause pain to the injured area. Protect your injured area.Slowly increase activity as told by your doctor.General instructions     Do deep breathing as told by your doctor. You may be told to:  Take deep breaths many times a day.Cough many times a day while hugging a pillow.Use a device (incentive spirometer) to do deep breathing many times a day.Drink enough fluid to keep your pee (urine) clear or pale yellow.Do not wear a rib belt or binder. These do not allow you to breathe deeply.Keep all follow-up visits as told by your doctor. This is important.Contact a doctor if:  You have a fever.Get help right away if:  You have trouble breathing.You are short of breath.You cannot stop coughing.You cough up thick or bloody spit (sputum).You feel sick to your stomach (nauseous), throw up (vomit), or have belly (abdominal) pain.Your pain gets worse and medicine does not help.Summary  A rib fracture is a break or crack in one of the bones of the ribs.Apply ice to the injured area and take medicines for pain as told by your doctor.Take deep breaths and cough many times a day. Hug a pillow every time you cough.This information is not intended to replace advice given to you by your health care provider. Make sure you discuss any questions you have with your health care provider.  **********************

## 2020-08-30 NOTE — ED PROVIDER NOTE - PHYSICAL EXAMINATION
msk/chest: +TTP along the left posterior lower ribs with mild bruising, no obvious deformity or frail chest noted. no midline spinal TTP

## 2020-08-30 NOTE — ED PROVIDER NOTE - OBJECTIVE STATEMENT
65 y/o M with PMH of dyslexia, HTN, HLD, T2IRDM, Afib on Eliquis, recently admitted for DKA and new onset Afib and dc'd few days ago, presents to the ED with c/o left upper back pain s/p mechanical trip and fall 3 days ago. Patient reports he tripped on a vine in the yard and fell backwards hitting his back against a chair. Pain is worse with laying down. He denies CP, SOB, symptoms prior to fall, head injury, LOC or all other complaints. Reports taking advil q3hrs with no relief

## 2020-08-30 NOTE — ED PROVIDER NOTE - ATTENDING CONTRIBUTION TO CARE
Nav with MITCHELL Argueta. 65 y/o M with PMH of dyslexia, HTN, HLD, T2IRDM, Afib on Eliquis, recently admitted for DKA and new onset Afib and dc'd few days ago, presents to the ED with c/o left upper back pain s/p mechanical trip and fall 3 days ago. Patient reports he tripped on a vine in the yard and fell backwards hitting his back against a chair. Pain is worse with laying down. He denies CP, SOB, symptoms prior to fall, head injury, LOC or all other complaints. Reports taking advil q3hrs with no relief. PE as noted above, 's. labs reviewed, patient hydrated and given insulin, BS improved, no clinical signs of DKA. imaging results reviewed-- rib fractures noted, patient informed of this. Will dc with pain meds. He is to f/u outpt with his providers. Patient stable for dc  I performed a face to face bedside interview with patient regarding history of present illness, review of symptoms and past medical history. I completed an independent physical exam.  I have discussed the patient's plan of care with Physician Assistant (PA). I agree with note as stated above, having amended the EMR as needed to reflect my findings.   This includes History of Present Illness, HIV, Past Medical/Surgical/Family/Social History, Allergies and Home Medications, Review of Systems, Physical Exam, and any Progress Notes during the time I functioned as the attending physician for this patient.

## 2020-08-30 NOTE — ED ADULT TRIAGE NOTE - CHIEF COMPLAINT QUOTE
Patient presents c/o fall 2 days ago, now c/o back pain. Patient also reports left foot wound. PMH DM

## 2020-11-22 ENCOUNTER — EMERGENCY (EMERGENCY)
Facility: HOSPITAL | Age: 65
LOS: 1 days | Discharge: HOME | End: 2020-11-22
Attending: EMERGENCY MEDICINE | Admitting: EMERGENCY MEDICINE
Payer: MEDICARE

## 2020-11-22 VITALS — WEIGHT: 244.05 LBS | TEMPERATURE: 96 F | HEIGHT: 76 IN

## 2020-11-22 VITALS — TEMPERATURE: 96 F
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
## 2020-11-22 DIAGNOSIS — I48.91 UNSPECIFIED ATRIAL FIBRILLATION: ICD-10-CM

## 2020-11-22 DIAGNOSIS — Z79.899 OTHER LONG TERM (CURRENT) DRUG THERAPY: ICD-10-CM

## 2020-11-22 DIAGNOSIS — Z79.01 LONG TERM (CURRENT) USE OF ANTICOAGULANTS: ICD-10-CM
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
## 2020-11-22 DIAGNOSIS — I46.9 CARDIAC ARREST, CAUSE UNSPECIFIED: ICD-10-CM

## 2020-11-22 DIAGNOSIS — S22.42XA MULTIPLE FRACTURES OF RIBS, LEFT SIDE, INITIAL ENCOUNTER FOR CLOSED FRACTURE: ICD-10-CM

## 2020-11-22 DIAGNOSIS — Z98.890 OTHER SPECIFIED POSTPROCEDURAL STATES: ICD-10-CM

## 2020-11-22 DIAGNOSIS — E11.65 TYPE 2 DIABETES MELLITUS WITH HYPERGLYCEMIA: ICD-10-CM

## 2020-11-22 DIAGNOSIS — E78.5 HYPERLIPIDEMIA, UNSPECIFIED: ICD-10-CM

## 2020-11-22 DIAGNOSIS — I10 ESSENTIAL (PRIMARY) HYPERTENSION: ICD-10-CM

## 2020-11-22 DIAGNOSIS — M54.6 PAIN IN THORACIC SPINE: ICD-10-CM

## 2020-11-22 DIAGNOSIS — Z79.4 LONG TERM (CURRENT) USE OF INSULIN: ICD-10-CM
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
## 2020-11-22 LAB — SARS-COV-2 RNA SPEC QL NAA+PROBE: SIGNIFICANT CHANGE UP

## 2020-11-22 PROCEDURE — 99285 EMERGENCY DEPT VISIT HI MDM: CPT | Mod: 25

## 2020-11-22 PROCEDURE — 87635 SARS-COV-2 COVID-19 AMP PRB: CPT

## 2020-11-22 PROCEDURE — 92950 HEART/LUNG RESUSCITATION CPR: CPT

## 2020-11-22 PROCEDURE — 31500 INSERT EMERGENCY AIRWAY: CPT

## 2020-11-22 PROCEDURE — 99285 EMERGENCY DEPT VISIT HI MDM: CPT | Mod: CS,25

## 2020-11-22 NOTE — ED ADULT TRIAGE NOTE - NS ED NURSE BANDS TYPE
Unable to reach patient, phone number not in service and work number the person states there is no one there by that name. Name band;

## 2020-11-22 NOTE — ED ADULT NURSE NOTE - OBJECTIVE STATEMENT
As per EMS pt was found in bushes at his home by neighbor unknown how long he was down but as per EMS at least 25 minutes, when they arrived at house HR 25. EMS stated they took his blood sugar and it stated 'high'. EMS had IO placed L. shin, L 20G AC. EMS stated 3 epi and 2 atropine given en route with no HR. As per EMS pt was found in bushes with no clothes on at his home by neighbor; unknown how long he was down but as per EMS at least 25 minutes, when they arrived at house pt unresponsive. EMS stated they took his blood sugar and it stated 'high', could not get reading. EMS had IO placed L. shin, L 20G AC. EMS stated 3 epi and 2 atropine given en route with no HR; CPR initiated en route to ED. On arrival, pt had notable scabbing throughout body, L bruise to hip. Coffee ground emesis noted in mouth which was suctioned by Dr. Prather before intubation. Noted bleeding in nose when swabbing for covid. Information on intubation, ACLS medications given, time of expiration, organ donation information in code flow sheet. Organ donation paper work filled out; Zheng came and reviewed and took pink sheet. Pt was a ME case, all tubing kept in place, got the ok from [facility] to have pt down to [facility]. As per EMS pt was found in bushes with no clothes on at his home by neighbor; unknown how long he was down but as per EMS at least 25 minutes, when they arrived at house pt unresponsive. EMS stated they took his blood sugar and it stated 'high', could not get reading. EMS had IO placed L. shin, L 20G AC. EMS stated 3 epi and 2 atropine given en route with no HR; CPR initiated en route to ED. On arrival, pt had notable scabbing throughout body, L bruise to hip. Coffee ground blood noted in mouth which was suctioned by Dr. Prather before intubation. Noted bleeding in nose when swabbing for covid. Information on intubation, ACLS medications given, time of expiration, organ donation information in code flow sheet. Organ donation paper work filled out; Zheng came and reviewed and took pink sheet. Pt was a ME case, all tubing kept in place, got the ok from [facility] to have pt down to [facility].

## 2020-11-22 NOTE — ED ADULT NURSE NOTE - NSIMPLEMENTINTERV_GEN_ALL_ED
Implemented All Fall Risk Interventions:  [place] to call system. Call bell, personal items and telephone within reach. Instruct patient to call for assistance. Room bathroom lighting operational. Non-slip footwear when patient is off stretcher. Physically safe environment: no spills, clutter or unnecessary equipment. Stretcher in lowest position, wheels locked, appropriate side rails in place. Provide visual cue, wrist band, yellow gown, etc. Monitor gait and stability. Monitor for mental status changes and reorient to person, place, and time. Review medications for side effects contributing to fall risk. Reinforce activity limits and safety measures with patient and family.

## 2020-11-22 NOTE — ED PROVIDER NOTE - PHYSICAL EXAMINATION
General: middle aged AAM , unresponsive, intubated, CPR in progress  Head:  NC/AT, EOMI, oral mucosa moist  Eyess: pupils non reactive, dilated and fixed  Neck:   supple  Lungs:   +BS with bagging  CVS:   +pulse with CPR  Abd:    non distended  Ext:    no sign of trauma  Neuro: unresponsive

## 2020-11-22 NOTE — ED PROVIDER NOTE - OBJECTIVE STATEMENT
65 y/o male with h/o DM, HTN, BIB EMS found naked found unresponsive in bushes by his neighbor , unknown how long he was in cold out side 63 y/o male with h/o DM, HTN, BIB EMS found naked found unresponsive in bushes by his neighbor , unknown how long he was in cold out side. EMS found pt unresponsive in asystolic arrest. CPR was started and was given Epi X3 and atropine x2 without any response. Pt arrived in Ed being bagged. Pt had coffee ground emesis on exam and was intubated in ED and CPR was continued , pt did not respond to CPR , was pronounced dead at 00.30 AM

## 2020-11-22 NOTE — ED PROVIDER NOTE - CLINICAL SUMMARY MEDICAL DECISION MAKING FREE TEXT BOX
pt with h/o HTN , DM, dyslipidemia was BIB ems found unresponsive near his house, pt was down for unknown amount of time, CPR was performed by EMS and in ED pt , found to have GI bleed with coffee ground emesis, did not respond to CPR , pronounced dead at 00:30 Am pt with h/o HTN , DM, dyslipidemia was BIB ems found unresponsive near his house, pt was down for unknown amount of time, CPR was performed by EMS and in ED pt , found to have GI bleed with coffee ground emesis, did not respond to CPR , pronounced dead at 00:30 Am, Me was called and was accepted by ME for autopsy, His sister Cirilo garibay was notified. [phone]

## 2020-11-23 NOTE — ED PROVIDER NOTE - REFUSAL OF SERVICE, MDM
71.2
Pt is A&Ox3 and has decisional capacity. No hypoglycemic episodes in the field or in the ED. D/w Dr. Prince and pt's sister Jimbo Nelson, who will pick him up and take him to Dr. Prince tomorrow.

## 2021-02-10 NOTE — DISCHARGE NOTE NURSING/CASE MANAGEMENT/SOCIAL WORK - NSTRANSFERBELONGINGSDISPO_GEN_A_NUR
Critical Care Critical Care Infectious Disease Infectious Disease Infectious Disease Infectious Disease Critical Care Infectious Disease MICU MICU MICU Critical Care MICU MICU Endocrinology Hospitalist Hospitalist Endocrinology Endocrinology Endocrinology Hospitalist Hospitalist Hospitalist Endocrinology Endocrinology Hospitalist Endocrinology Hospitalist Hospitalist not applicable Hospitalist

## 2021-05-18 NOTE — ED ADULT NURSE NOTE - NS_NURSE_DISC_TEACHING_YN_ED_ALL_ED
Sports Medicine Consultation     CHIEF COMPLAINT:  Knee Pain (Lt knee. 15yrs. no injury. 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 )      HPI:  Giovani Krishnan is a 35y.o. year old female who is a new patient being seen for regarding new problem left knee pain. The pain has been present for 15 year(s). 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 The patient has tried no specific without improvement. The pain is described as dull achy but noticable. There is not pain on weightbearing. The knee does not swell. There is is not painful popping and clicking. The knee does not catch or lock. It has not given out. It is not stiff upon arising from sitting. It is not painful to go up and down stairs and sit for a prolonged period of time. she has a past medical history of Abnormal Pap smear of cervix, BRBPR (bright red blood per rectum), Chronic constipation, History of anal fissures, History of hemorrhoids, HPV in female, and HSV infection. she has a past surgical history that includes Umbilical hernia repair; Tonsillectomy; [place] tooth extraction; Colposcopy; intrauterine device insertion (2017); Insertion of contraceptive capsule (2011); Removal of contraceptive capsule (2011); and Colonoscopy (10/2020). family history includes Alcohol Abuse in her father; Breast Cancer in her paternal grandmother; Cervical Cancer (age of onset: 37) in her mother; Diabetes in her mother; Kidney Disease in her maternal grandfather; No Known Problems in her maternal grandmother; Obesity in her maternal grandfather and mother; Other in her brother and father; Substance Abuse in her sister; Thyroid Disease in her brother.     Social History     Socioeconomic History    Marital status: Single     Spouse name: Not on file    Number of children: Not on file    Years of education: Not on file    Highest education level: Not on file Occupational History    Not on file   Tobacco Use    Smoking status: Never Smoker    Smokeless tobacco: Never Used   Vaping Use    Vaping Use: Never used   Substance and Sexual Activity    Alcohol use: No    Drug use: No    Sexual activity: Not Currently     Partners: Male     Birth control/protection: I.U.D. Other Topics Concern    Not on file   Social History Narrative    Not on file     Social Determinants of Health     Financial Resource Strain:     Difficulty of Paying Living Expenses:    Food Insecurity:     Worried About Running Out of Food in the Last Year:     [address] in the Last Year:    Transportation Needs:     Lack of Transportation (Medical):  Lack of Transportation (Non-Medical):    Physical Activity:     Days of Exercise per Week:     Minutes of Exercise per Session:    Stress:     Feeling of Stress :    Social Connections:     Frequency of Communication with Friends and Family:     Frequency of Social Gatherings with Friends and Family:     Attends Hindu Services:     Active Member of Clubs or Organizations:     Attends Club or Organization Meetings:     Marital Status:    Intimate Partner Violence:     Fear of Current or Ex-Partner:     Emotionally Abused:     Physically Abused:     Sexually Abused:        Current Outpatient Medications   Medication Sig Dispense Refill    polyethylene glycol (GLYCOLAX) 17 GM/SCOOP powder Take 17 g by mouth daily as needed       No current facility-administered medications for this visit. Allergies:  sheis allergic to percocet [oxycodone-acetaminophen] and vancomycin. ROS:  CV:  Denies chest pain; palpitations; shortness of breath; swelling of feet, ankles; and loss of consciousness. CON: Denies fever and dizziness. ENT:  Denies hearing loss / ringing, ear infections hoarseness, and swallowing problems. RESP:  Denies chronic cough, spitting up blood, and asthma/wheezing.   GI: Denies abdominal pain, change in bowel habits, nausea or vomiting, and blood in stools. :  Denies frequent urination, burning or painful urination, blood in the urine, and bladder incontinence. NEURO:  Denies headache, memory loss, sleep disturbance, and tremor or movement disorder. PHYSICAL EXAM:   BP (!) 123/91 (Site: Right Upper Arm)   Pulse 87   Ht 5' 6\" (1.676 m)   Wt 226 lb (102.5 kg)   BMI 36.48 kg/m²   GENERAL: Sandra Garcia is a 35 y.o. female who is alert and oriented and sitting comfortably in our office. SKIN:  Intact without rashes, lesions or ulcerations. NEURO: Sensation to the extremity is intact. VASC:  Capillary refill is less than 3 seconds. Distal pulses are palpable. There is no lymphadenopathy. Knee Exam  Musculoskeletal/Neurologic:  Inspection-Swelling: none, Ecchymosis: no  Palpation-Tenderness:pf compartment  Pain with patellar grind: yes  ROM- -5-135  Strength- WNL  Sensation-normal to light touch    Special Tests-  Varus Laxity: negative   Valgus Laxity:  negative   Anterior Drawer: negative   Posterior Drawer: negative  Lachman's: negative  Jaswinder's:negative  Thessaly: negative    Single Leg Squat: Positive  Deep Squat: Positive  Gait: valgus thrust    PSYCH:  Good fund of knowledge and displays understanding of exam.    RADIOLOGY: XR KNEE LEFT (3 VIEWS)    Result Date: 5/10/2021  No acute fracture or dislocation. IMPRESSION:     1. Patellofemoral syndrome of left knee          PLAN:   We discussed some of the etiologies and natural histories of     ICD-10-CM    1. Patellofemoral syndrome of left knee  M22.2X2 [facility]   . We discussed the various treatment alternatives including anti-inflammatory medications, physical therapy, injections, further imaging studies and as a last resort surgery.   At this point her problem is patellofemoral in nature we will treat her conservatively with a course of formal physical therapy and patellofemoral bracing we will see her back otherwise as needed she voiced understanding agreement this plan    Return to clinic in No follow-ups on file. Kirti Pradhan Please be aware portions of this note were completed using voice recognition software and unforeseen errors may have occurred    Electronically signed by Claudeen Challenger, DO, FAOASM  on 5/18/21 at 10:47 AM EDT        No orders of the defined types were placed in this encounter. Yes

## 2021-08-02 NOTE — PHYSICAL THERAPY INITIAL EVALUATION ADULT - ASSISTIVE DEVICE FOR TRANSFER: GAIT, REHAB EVAL
Process Group Note    PATIENT'S NAME: Xavier Odell  MRN:   [id number]  :   1985  ACCT. NUMBER: [id number]  DATE OF SERVICE: 21  START TIME:  9:00 AM  END TIME:  9:50 AM  FACILITATOR: James Sequeira LMFT  TOPIC:  Process Group    Diagnoses:  296.33 (F33.2) Major Depressive Disorder, Recurrent Episode, Severe.  4. Other Diagnoses that is relevant to services:   Substance-Related & Addictive Disorders Alcohol Use Disorder   303.90 (F10.20) Severe.    [facility] Adult Dual Diagnosis Day Treatment  TRACK: 1    NUMBER OF PARTICIPANTS: 6                                      Service Modality:  Video Visit     Telemedicine Visit: The patient's condition can be safely assessed and treated via synchronous audio and visual telemedicine encounter.      Reason for Telemedicine Visit: Services only offered telehealth    Originating Site (Patient Location): Patient's home    Distant Site (Provider Location): Provider Remote Setting- Home Office    Consent:  The patient/guardian has verbally consented to: the potential risks and benefits of telemedicine (video visit) versus in person care; bill my insurance or make self-payment for services provided; and responsibility for payment of non-covered services.     Patient would like the video invitation sent by:  My Chart    Mode of Communication:  Video Conference via Medical Zoom    As the provider I attest to compliance with applicable laws and regulations related to telemedicine.               Data:    Session content: At the start of this group, patients were invited to check in by identifying themselves, describing their current emotional status, and identifying issues to address in this group.   Area(s) of treatment focus addressed in this session included Symptom Management, Personal Safety and Abstinence/Relapse Prevention.    Xavier is feeling crummy, he relapsed, feeling guilty, depression, negative thoughts, his goal for the day is to be a  functioning person, his goal is to stay sober, is using mindfulnes, caring for himself, reports last use was Saturday, denies safety concerns, is going to start taking rx again, is grateful for his family, is asking for listening, processed about his use last week    Therapeutic Interventions/Treatment Strategies:  Psychotherapist offered support, feedback and validation and reinforced use of skills. Treatment modalities used include Motivational Interviewing, Cognitive Behavioral Therapy and Dialectical Behavioral Therapy. Interventions include Relapse Prevention: Facilitated understanding the importance of awareness of factors that contribute to relapse , Assisted patient in identifying personal vulnerabilities, thoughts, emotions, and situations that may lead to relapse  and Coached on skills to manage factors that contribute to relapse.    Assessment:    Patient response:   Patient responded to session by accepting feedback, listening, being attentive and appearing alert    Possible barriers to participation / learning include: and no barriers identified    Health Issues:   None reported       Substance Use Review:   Substance Use: Last use: 7/31    Mental Status/Behavioral Observations  Appearance:   Disheveled   Eye Contact:   Good   Psychomotor Behavior: Normal   Attitude:   Cooperative   Orientation:   All  Speech   Rate / Production: Normal    Volume:  Normal   Mood:    Anxious   Affect:    Blunted   Thought Content:   Clear and Safety denies any current safety concerns including suicidal ideation, self-harm, and homicidal ideation  Thought Form:  Coherent  Logical     Insight:    Good     Plan:     Safety Plan: No current safety concerns identified.  Recommended that patient call 911 or go to the local ED should there be a change in any of these risk factors.     Barriers to treatment: None identified    Patient Contracts (see media tab):  None    Substance Use: Provided encouragement towards  sobriety    Provided support and affirmation for steps taken towards sobriety     Facilitated behavior chain analysis     Continue or Discharge: Patient will continue in Adult Dual Disorder Program (DDP) as planned. Patient is likely to benefit from learning and using skills as they work toward the goals identified in their treatment plan.      David Sequeira, LMFT  August 2, 2021     rolling walker

## 2021-10-26 NOTE — ED PROVIDER NOTE - EXPIRE DEATH NOTIFICATION
pt is non verbal/able to follow single-step instructions
Medical Examiner notified/Next of kin notified

## 2022-02-08 NOTE — ED ADULT NURSE NOTE - EXTENSIONS OF SELF_ADULT
Assessment/Plan:    1  Essential hypertension  - recent EKG 12/22/2021 reviewed and showed NSR at 77 BPM, will d/c amlodipine due to side effects and start patient on Toprol XL 25 mg 1/2 tablet at bedtime, advised to continue home BP and pulse monitoring and bring BP log to the next visit  - metoprolol succinate (Toprol XL) 25 mg 24 hr tablet; Take 0 5 tablets (12 5 mg total) by mouth daily at bedtime  Dispense: 30 tablet; Refill: 1    2  Eye problem  - follow up with Ophthalmology as scheduled tomorrow       Return as scheduled or sooner if needed  The treatment plan and possible side effects of new medications were reviewed with the patient today  The patient understands and agrees with the treatment plan  Subjective:   Chief Complaint   Patient presents with    Hypertension    Eye Problem      Patient ID: Lemuel Churchill is a 77 y o  female who presents today for follow up hypertension  At her last visit she was prescribed amlodipine 2 5 mg daily, patient took the medication for 2 days, but developed lightheadedness and palpitations and stopped taking it  Patient reports home BP readings in 130's-150's/ 80's  Patient was also prescribed erythromycin eye oint due to right lower lid stye and used it as directed for 7 days, the lump has decreased in size, but the area of redness has not improved, no eye pain or discharge, she is schedule to see her eye doctor tomorrow        The following portions of the patient's history were reviewed and updated as appropriate: allergies, current medications, past family history, past medical history, past social history, past surgical history and problem list     Past Medical History:   Diagnosis Date    Anxiety     Arthritis     Back pain     Balance problems     Chest pain     heaviness    Difficulty swallowing     Dizziness     Gait disorder     GERD (gastroesophageal reflux disease)     Hypertension     Increased frequency of headaches     Numbness and tingling     Palpitations     Pericarditis     Thyroid disease     Trouble in sleeping      Past Surgical History:   Procedure Laterality Date    APPENDECTOMY      CHOLECYSTECTOMY      laparoscopic    COLONOSCOPY      KNEE SURGERY Left     PERICARDIAL WINDOW      [place] OPEN RX DISTAL RADIUS FX, EXTRA-ARTICULAR Right 3/22/2018    Procedure: OPEN REDUCTION W/ INTERNAL FIXATION (ORIF) RADIUS, splint application;  Surgeon: Mariola Tanner MD;  Location: [facility];  Service: Orthopedics    UPPER GASTROINTESTINAL ENDOSCOPY       Family History   Problem Relation Age of Onset    Kidney failure Mother     Hypertension Mother     Lung cancer Father    Maryann Draft Parkinson White syndrome Daughter     No Known Problems Sister     Substance Abuse Neg Hx     Alcohol abuse Neg Hx     Mental illness Neg Hx      Social History     Socioeconomic History    Marital status: /Civil Union     Spouse name: Not on file    Number of children: Not on file    Years of education: Not on file    Highest education level: Not on file   Occupational History    Not on file   Tobacco Use    Smoking status: Never Smoker    Smokeless tobacco: Never Used   Vaping Use    Vaping Use: Never used   Substance and Sexual Activity    Alcohol use: Yes     Comment: Rarely     Drug use: No    Sexual activity: Not on file   Other Topics Concern    Not on file   Social History Narrative    WORK:    1  [place] ([employer]; [employer]) - concern for mold exposure    2  [employer]        HOBBIES:    1  Singing    2  Dancing    3  Hx of ceramics (+ dust)        PETS:    1    Dogs    -  Denies birds        TRAVEL:    -  [place]         EXPOSURES:    Denies mold; down pillows/comforters; hot tubs     Social Determinants of Health     Financial Resource Strain: Not on file   Food Insecurity: Not on file   Transportation Needs: Not on file   Physical Activity: Not on file   Stress: Not on file   Social Connections: Not on file Intimate Partner Violence: Not on file   Housing Stability: Not on file       Current Outpatient Medications:     ALPRAZolam (XANAX) 0 5 mg tablet, Take 1 tablet (0 5 mg total) by mouth daily at bedtime as needed for anxiety or sleep, Disp: 20 tablet, Rfl: 1    amLODIPine (NORVASC) 2 5 mg tablet, Take 1 tablet (2 5 mg total) by mouth daily, Disp: 30 tablet, Rfl: 3    ascorbic acid (VITAMIN C) 500 mg tablet, Take 500 mg by mouth daily, Disp: , Rfl:     aspirin 81 mg chewable tablet, Chew 1 tablet (81 mg total) daily (Patient taking differently: Chew 81 mg as needed ), Disp: 30 tablet, Rfl: 0    b complex vitamins tablet, Take 1 tablet by mouth daily, Disp: , Rfl:     Cholecalciferol (Vitamin D) 50 MCG (2000 UT) CAPS, Take 1 capsule (2,000 Units total) by mouth daily, Disp: , Rfl:     dicyclomine (BENTYL) 10 mg capsule, Take 1 capsule (10 mg total) by mouth 4 (four) times a day (before meals and at bedtime) (Patient taking differently: Take 10 mg by mouth as needed ), Disp: 30 capsule, Rfl: 1    erythromycin (ILOTYCIN) ophthalmic ointment, Administer 0 5 inches to the right eye 2 (two) times a day for 5 days, Disp: 3 5 g, Rfl: 0    famotidine (PEPCID) 20 mg tablet, Take 1 tablet (20 mg total) by mouth daily, Disp: 30 tablet, Rfl: 5    Magnesium 100 MG CAPS, magnesium  300 mg qd, Disp: , Rfl:     Multiple Vitamins-Calcium (ONE-A-DAY WOMENS PO), Take 1 tablet by mouth daily, Disp: , Rfl:     omeprazole (PriLOSEC) 20 mg delayed release capsule, Take 1 capsule (20 mg total) by mouth daily before breakfast, Disp: 90 capsule, Rfl: 1    Riboflavin (VITAMIN B-2 PO), Take by mouth, Disp: , Rfl:     traZODone (DESYREL) 50 mg tablet, Take 1 tablet (50 mg total) by mouth daily at bedtime (Patient taking differently: Take 50 mg by mouth as needed ), Disp: 30 tablet, Rfl: 1    Review of Systems   Constitutional: Negative for chills, fatigue and fever  Eyes: Positive for redness   Negative for photophobia, pain, discharge and visual disturbance  Respiratory: Negative for cough, shortness of breath and wheezing  Cardiovascular: Negative for chest pain and palpitations  Gastrointestinal: Negative for abdominal pain, blood in stool, diarrhea, nausea and vomiting  Neurological: Negative for dizziness, syncope, weakness, numbness and headaches  Hematological: Negative for adenopathy  Objective:    Vitals:    02/08/22 1339 02/08/22 1352   BP: 122/82 136/82   BP Location:  Left arm   Patient Position:  Sitting   Cuff Size:  Standard   Pulse: 80    Resp: 16    Weight: 79 6 kg (175 lb 6 4 oz)    Height: 5' 7" (1 702 m)         Physical Exam  Constitutional:       General: She is not in acute distress  Appearance: She is well-developed  Eyes:      Extraocular Movements: Extraocular movements intact  Pupils: Pupils are equal, round, and reactive to light  Neck:      Thyroid: No thyromegaly  Cardiovascular:      Rate and Rhythm: Normal rate and regular rhythm  Heart sounds: Normal heart sounds  No murmur heard  Pulmonary:      Effort: Pulmonary effort is normal       Breath sounds: Normal breath sounds  No wheezing, rhonchi or rales  Chest:      Chest wall: No tenderness  Abdominal:      General: There is no distension  Palpations: Abdomen is soft  There is no mass  Tenderness: There is no abdominal tenderness  Musculoskeletal:      Cervical back: Neck supple  Right lower leg: No edema  Left lower leg: No edema  Lymphadenopathy:      Cervical: No cervical adenopathy  Neurological:      Mental Status: She is alert and oriented to person, place, and time  Psychiatric:         Mood and Affect: Mood normal          Behavior: Behavior normal          Thought Content:  Thought content normal  None

## 2022-06-02 NOTE — PATIENT PROFILE ADULT - NSPROPTRIGHTREPNAME_GEN_A__NUR
Vaginal Delivery Note





- .


Vaginal Delivery Note: 





The patient is a 40-year-old  7 para 0333 who was admitted through triage

last evening at 35-5/7 weeks after having been in triage several times in the 

last several days complaining of contractions.  Her cervix was initially found 

to be 3 cm and, upon return to triage, found to be 4 cm and then continuing to 

dilate despite minimal contractions being monitored.  She was admitted for 

observation given her early gestational age.  She had received steroids from p

revious visits to triage under similar circumstances.  During the middle the 

night, she was reported to be 5-6 cm dilated and in more discomfort and was 

provided with an epidural catheter which essentially guarantee delivery without 

discharge.  As a result, her cervical check this morning was approximate 5-6 cm 

and still relatively thick and high.  Pitocin augmentation was started and she 

made some progress through the morning to approximately 6-7 cm, 50% effaced, 

vertex in presentation at -3 station.  Artificial rupture of membranes was 

carried out for clear fluid.  There was a category 1 heart rate tracing 

throughout labor and delivery.  She then continued had Pitocin augmentation and 

made slow progress then suddenly complained of any urge to push and was found to

be complete though still relatively high station of approximately -3.  I arrived

at that time she began pushing.  She pushed over the course of approximately 6 

or 7 contractions and ultimately pushed to a normal spontaneous vaginal delivery

of a viable 6 lbs. 8 oz. baby boy with Apgars of 8 at 1 minute and 9 at 5 

minutes delivered in the direct occiput anterior position.  While awaiting 

placental delivery, the cord lacerated 2 separate times and, after approximately

15 minutes of wait, the decision was made to proceed with manual extraction.  I 

was able to extract the placenta entirely intact and noted that there was a 

bilobed placenta with the secondary lobe Aruna trapped in the fundus of the 

uterus.  As noted, the placenta was grossly normal although bilobed in nature 

with the cord coming off of the larger portion.  There was a grossly normal 

three-vessel cord inserted relatively centrally on the larger lobe of the 

placenta.  There are no lacerations of the perineum, vagina, or cervix.  

Estimated blood loss for the case was approximately 250 mL.  There were no 

complications aside from the necessity for manual extraction of placenta.  All 

sponge, instrument, needle counts were correct.  The patient tolerated the 

procedure well.  Both mother and infant are resting comfortably in recovery. Jimbo Damon

## 2022-07-14 NOTE — ED ADULT NURSE NOTE - CADM POA PRESS ULCER
outpt BP goal < 130/80   - defer to primary team   - outpatient annual urinary microalb/cr ratio. Unable to assess at this time due to patient’s acuity Patient denies cigarette or illicit drug use including cocaine, marijuana, amphetamines, opiates. Drinks 1-2 alcoholic beverages/month.

## 2022-11-07 NOTE — ED ADULT TRIAGE NOTE - PRO INTERPRETER NEED 2
Medication name: albuterol 108 (90 Base) MCG/ACT inhaler  Last Refill Details: Date 04/22/2022, Quantity:  18 g, refills 0   Ordering provider name: Kvng Francis  Last office visit: 04/22/2022 with provider Kvng Francis MD   Refill Request Approved - caller notified and encouraged to follow-up with provider.     Pt has upcoming appt on 11/10/2022 with pcp.   English

## 2023-01-26 NOTE — ED PROVIDER NOTE - ATTESTATION, MLM
none
I have reviewed and confirmed nurses' notes for patient's medications, allergies, medical history, and surgical history.

## 2023-04-06 NOTE — ED ADULT NURSE NOTE - MODE OF DISCHARGE
Ambulatory Soolantra Pregnancy And Lactation Text: This medication is Pregnancy Category C. This medication is considered safe during breast feeding.

## 2023-05-30 NOTE — STROKE CODE NOTE - TIME PATIENT LAST KNOWN WELL
05:00 Spironolactone Pregnancy And Lactation Text: This medication can cause feminization of the male fetus and should be avoided during pregnancy. The active metabolite is also found in breast milk.

## 2023-10-05 NOTE — ED ADULT TRIAGE NOTE - HEART RATE (BEATS/MIN)
105

## 2023-12-24 NOTE — PATIENT PROFILE ADULT - NSTRANSFERBELONGINGSDISPO_GEN_A_NUR
Bed: 17main  Expected date:   Expected time:   Means of arrival:   Comments:  CRITICAL   not applicable

## 2023-12-26 NOTE — ED ADULT NURSE NOTE - NEURO ASSESSMENT
Patient awaiting transfer to psychiatric facility at the time of signout and during the entirety of my shift.   - - -

## 2024-02-08 NOTE — ED ADULT TRIAGE NOTE - ARRIVAL FROM
Recent PHQ 2/9 Score    PHQ 2:  PHQ 2 Score Peds PHQ 2 Score Peds PHQ 2 Interpretation   2/8/2024   4:11 PM 0 No further screening needed       PHQ 9:  PHQ 9 Score Peds PHQ 9 Score   2/8/2024   4:11 PM 0            Street

## 2025-03-26 NOTE — STROKE CODE NOTE - OBJECTIVE
<--- Click to Launch ICDx for PreOp, PostOp and Procedure
Impression. On 6/21/18 he was found on the ground "confused" with subsequent improvement. His clinical presentation may be consistent with diffuse cerebral dysfunction, etiology unclear. It's possible that he had a seizure and was postictal, or that he had syncope with a concussion. Doubt stroke, including posterior circulation infarction. Given the probable diagnosis not being stroke, he is excluded from IV TPA and endovascular thrombectomy. Suggest. Further management as per [facility].